# Patient Record
Sex: FEMALE | Race: WHITE | Employment: OTHER | URBAN - METROPOLITAN AREA
[De-identification: names, ages, dates, MRNs, and addresses within clinical notes are randomized per-mention and may not be internally consistent; named-entity substitution may affect disease eponyms.]

---

## 2017-01-05 ENCOUNTER — HOSPITAL ENCOUNTER (EMERGENCY)
Age: 62
Discharge: LEFT BEFORE DISCHARGE | End: 2017-01-05
Attending: EMERGENCY MEDICINE
Payer: COMMERCIAL

## 2017-01-05 VITALS
BODY MASS INDEX: 20.09 KG/M2 | DIASTOLIC BLOOD PRESSURE: 87 MMHG | HEART RATE: 81 BPM | HEIGHT: 67 IN | RESPIRATION RATE: 16 BRPM | OXYGEN SATURATION: 100 % | WEIGHT: 128 LBS | SYSTOLIC BLOOD PRESSURE: 129 MMHG | TEMPERATURE: 98 F

## 2017-01-05 PROCEDURE — 75810000275 HC EMERGENCY DEPT VISIT NO LEVEL OF CARE: Performed by: EMERGENCY MEDICINE

## 2017-01-05 NOTE — ED NOTES
PMD-Dr Vernon Hunt. Pt c/o avulsion to right 4th finger x 15 minutes. She states that she got it caught while closing a gate. No active bleeding.

## 2017-10-30 ENCOUNTER — HOSPITAL ENCOUNTER (OUTPATIENT)
Dept: MAMMOGRAPHY | Age: 62
Discharge: HOME | End: 2017-10-30
Attending: NURSE PRACTITIONER
Payer: COMMERCIAL

## 2017-10-30 DIAGNOSIS — Z12.31 VISIT FOR SCREENING MAMMOGRAM: ICD-10-CM

## 2017-10-30 PROCEDURE — 77067 SCR MAMMO BI INCL CAD: CPT

## 2018-01-24 ENCOUNTER — HOSPITAL ENCOUNTER (OUTPATIENT)
Dept: GENERAL RADIOLOGY | Age: 63
Discharge: HOME | End: 2018-01-24
Payer: COMMERCIAL

## 2018-01-24 ENCOUNTER — HOSPITAL ENCOUNTER (OUTPATIENT)
Dept: LAB | Age: 63
Discharge: HOME | End: 2018-01-24

## 2018-01-24 DIAGNOSIS — J45.909 UNCOMPLICATED ASTHMA, UNSPECIFIED ASTHMA SEVERITY, UNSPECIFIED WHETHER PERSISTENT: ICD-10-CM

## 2018-01-24 LAB
FLUAV AG NPH QL IA: NEGATIVE
FLUBV AG NPH QL IA: NEGATIVE

## 2018-01-24 PROCEDURE — 87804 INFLUENZA ASSAY W/OPTIC: CPT | Performed by: NURSE PRACTITIONER

## 2018-01-24 PROCEDURE — 71046 X-RAY EXAM CHEST 2 VIEWS: CPT

## 2018-01-24 NOTE — PROGRESS NOTES
Pt called and notified that flu swab was negative. She was told not to take the tamiflu. Pt verbalized understanding.

## 2018-03-08 ENCOUNTER — ANESTHESIA EVENT (OUTPATIENT)
Dept: SURGERY | Age: 63
End: 2018-03-08
Payer: COMMERCIAL

## 2018-03-09 ENCOUNTER — ANESTHESIA (OUTPATIENT)
Dept: SURGERY | Age: 63
End: 2018-03-09
Payer: COMMERCIAL

## 2018-03-09 ENCOUNTER — HOSPITAL ENCOUNTER (OUTPATIENT)
Age: 63
Setting detail: OUTPATIENT SURGERY
Discharge: HOME | End: 2018-03-09
Attending: ORTHOPAEDIC SURGERY | Admitting: ORTHOPAEDIC SURGERY
Payer: COMMERCIAL

## 2018-03-09 VITALS
SYSTOLIC BLOOD PRESSURE: 155 MMHG | HEART RATE: 79 BPM | OXYGEN SATURATION: 96 % | TEMPERATURE: 97 F | RESPIRATION RATE: 16 BRPM | DIASTOLIC BLOOD PRESSURE: 90 MMHG

## 2018-03-09 PROCEDURE — A4565 SLINGS: HCPCS | Performed by: ORTHOPAEDIC SURGERY

## 2018-03-09 PROCEDURE — 77030006891 HC BLD SHV RESECT STRY -B: Performed by: ORTHOPAEDIC SURGERY

## 2018-03-09 PROCEDURE — 74011250636 HC RX REV CODE- 250/636

## 2018-03-09 PROCEDURE — 77030003602 HC NDL NRV BLK BBMI -B: Performed by: ANESTHESIOLOGY

## 2018-03-09 PROCEDURE — 77030011640 HC PAD GRND REM COVD -A: Performed by: ORTHOPAEDIC SURGERY

## 2018-03-09 PROCEDURE — 77030020143 HC AIRWY LARYN INTUB CGAS -A: Performed by: REGISTERED NURSE

## 2018-03-09 PROCEDURE — 74011250637 HC RX REV CODE- 250/637: Performed by: ANESTHESIOLOGY

## 2018-03-09 PROCEDURE — 76060000032 HC ANESTHESIA 0.5 TO 1 HR: Performed by: ORTHOPAEDIC SURGERY

## 2018-03-09 PROCEDURE — 77030027384 HC PRB ARTHSCP SERFAS STRY -C: Performed by: ORTHOPAEDIC SURGERY

## 2018-03-09 PROCEDURE — 76010010054 HC POST OP PAIN BLOCK: Performed by: ORTHOPAEDIC SURGERY

## 2018-03-09 PROCEDURE — 77030019605: Performed by: ORTHOPAEDIC SURGERY

## 2018-03-09 PROCEDURE — C1713 ANCHOR/SCREW BN/BN,TIS/BN: HCPCS | Performed by: ORTHOPAEDIC SURGERY

## 2018-03-09 PROCEDURE — 74011250636 HC RX REV CODE- 250/636: Performed by: ORTHOPAEDIC SURGERY

## 2018-03-09 PROCEDURE — 76010000160 HC OR TIME 0.5 TO 1 HR INTENSV-TIER 1: Performed by: ORTHOPAEDIC SURGERY

## 2018-03-09 PROCEDURE — 76210000021 HC REC RM PH II 0.5 TO 1 HR: Performed by: ORTHOPAEDIC SURGERY

## 2018-03-09 PROCEDURE — 74011000250 HC RX REV CODE- 250

## 2018-03-09 PROCEDURE — 77030002933 HC SUT MCRYL J&J -A: Performed by: ORTHOPAEDIC SURGERY

## 2018-03-09 PROCEDURE — 77030003666 HC NDL SPINAL BD -A: Performed by: ORTHOPAEDIC SURGERY

## 2018-03-09 PROCEDURE — 77030004453 HC BUR SHV STRY -B: Performed by: ORTHOPAEDIC SURGERY

## 2018-03-09 PROCEDURE — 76942 ECHO GUIDE FOR BIOPSY: CPT | Performed by: ORTHOPAEDIC SURGERY

## 2018-03-09 PROCEDURE — 76210000006 HC OR PH I REC 0.5 TO 1 HR: Performed by: ORTHOPAEDIC SURGERY

## 2018-03-09 PROCEDURE — 77030006788 HC BLD SAW OSC STRY -B: Performed by: ORTHOPAEDIC SURGERY

## 2018-03-09 PROCEDURE — 77030018836 HC SOL IRR NACL ICUM -A: Performed by: ORTHOPAEDIC SURGERY

## 2018-03-09 PROCEDURE — 74011250636 HC RX REV CODE- 250/636: Performed by: ANESTHESIOLOGY

## 2018-03-09 PROCEDURE — 77030032490 HC SLV COMPR SCD KNE COVD -B: Performed by: ORTHOPAEDIC SURGERY

## 2018-03-09 RX ORDER — SODIUM CHLORIDE 0.9 % (FLUSH) 0.9 %
5-10 SYRINGE (ML) INJECTION AS NEEDED
Status: DISCONTINUED | OUTPATIENT
Start: 2018-03-09 | End: 2018-03-09 | Stop reason: HOSPADM

## 2018-03-09 RX ORDER — LIDOCAINE HYDROCHLORIDE 20 MG/ML
INJECTION, SOLUTION EPIDURAL; INFILTRATION; INTRACAUDAL; PERINEURAL AS NEEDED
Status: DISCONTINUED | OUTPATIENT
Start: 2018-03-09 | End: 2018-03-09 | Stop reason: HOSPADM

## 2018-03-09 RX ORDER — EPINEPHRINE 1 MG/ML
INJECTION, SOLUTION, CONCENTRATE INTRAVENOUS AS NEEDED
Status: DISCONTINUED | OUTPATIENT
Start: 2018-03-09 | End: 2018-03-09 | Stop reason: HOSPADM

## 2018-03-09 RX ORDER — FENTANYL CITRATE 50 UG/ML
100 INJECTION, SOLUTION INTRAMUSCULAR; INTRAVENOUS ONCE
Status: COMPLETED | OUTPATIENT
Start: 2018-03-09 | End: 2018-03-09

## 2018-03-09 RX ORDER — PROPOFOL 10 MG/ML
INJECTION, EMULSION INTRAVENOUS AS NEEDED
Status: DISCONTINUED | OUTPATIENT
Start: 2018-03-09 | End: 2018-03-09 | Stop reason: HOSPADM

## 2018-03-09 RX ORDER — OXYCODONE HYDROCHLORIDE 5 MG/1
5 TABLET ORAL
Status: DISCONTINUED | OUTPATIENT
Start: 2018-03-09 | End: 2018-03-09 | Stop reason: HOSPADM

## 2018-03-09 RX ORDER — LIDOCAINE HYDROCHLORIDE 10 MG/ML
0.1 INJECTION INFILTRATION; PERINEURAL AS NEEDED
Status: DISCONTINUED | OUTPATIENT
Start: 2018-03-09 | End: 2018-03-09 | Stop reason: HOSPADM

## 2018-03-09 RX ORDER — DEXAMETHASONE SODIUM PHOSPHATE 4 MG/ML
INJECTION, SOLUTION INTRA-ARTICULAR; INTRALESIONAL; INTRAMUSCULAR; INTRAVENOUS; SOFT TISSUE AS NEEDED
Status: DISCONTINUED | OUTPATIENT
Start: 2018-03-09 | End: 2018-03-09 | Stop reason: HOSPADM

## 2018-03-09 RX ORDER — SODIUM CHLORIDE 0.9 % (FLUSH) 0.9 %
5-10 SYRINGE (ML) INJECTION EVERY 8 HOURS
Status: DISCONTINUED | OUTPATIENT
Start: 2018-03-09 | End: 2018-03-09 | Stop reason: HOSPADM

## 2018-03-09 RX ORDER — MIDAZOLAM HYDROCHLORIDE 1 MG/ML
2 INJECTION, SOLUTION INTRAMUSCULAR; INTRAVENOUS ONCE
Status: COMPLETED | OUTPATIENT
Start: 2018-03-09 | End: 2018-03-09

## 2018-03-09 RX ORDER — ONDANSETRON 2 MG/ML
INJECTION INTRAMUSCULAR; INTRAVENOUS AS NEEDED
Status: DISCONTINUED | OUTPATIENT
Start: 2018-03-09 | End: 2018-03-09 | Stop reason: HOSPADM

## 2018-03-09 RX ORDER — SCOLOPAMINE TRANSDERMAL SYSTEM 1 MG/1
1 PATCH, EXTENDED RELEASE TRANSDERMAL ONCE
Status: DISCONTINUED | OUTPATIENT
Start: 2018-03-09 | End: 2018-03-09 | Stop reason: HOSPADM

## 2018-03-09 RX ORDER — ALBUTEROL SULFATE 0.83 MG/ML
2.5 SOLUTION RESPIRATORY (INHALATION) AS NEEDED
Status: DISCONTINUED | OUTPATIENT
Start: 2018-03-09 | End: 2018-03-09 | Stop reason: HOSPADM

## 2018-03-09 RX ORDER — CELECOXIB 200 MG/1
200 CAPSULE ORAL
Status: DISCONTINUED | OUTPATIENT
Start: 2018-03-09 | End: 2018-03-09

## 2018-03-09 RX ORDER — HYDROMORPHONE HYDROCHLORIDE 2 MG/ML
0.5 INJECTION, SOLUTION INTRAMUSCULAR; INTRAVENOUS; SUBCUTANEOUS
Status: DISCONTINUED | OUTPATIENT
Start: 2018-03-09 | End: 2018-03-09 | Stop reason: HOSPADM

## 2018-03-09 RX ORDER — SODIUM CHLORIDE, SODIUM LACTATE, POTASSIUM CHLORIDE, CALCIUM CHLORIDE 600; 310; 30; 20 MG/100ML; MG/100ML; MG/100ML; MG/100ML
50 INJECTION, SOLUTION INTRAVENOUS CONTINUOUS
Status: DISCONTINUED | OUTPATIENT
Start: 2018-03-09 | End: 2018-03-09 | Stop reason: HOSPADM

## 2018-03-09 RX ORDER — SODIUM CHLORIDE, SODIUM LACTATE, POTASSIUM CHLORIDE, CALCIUM CHLORIDE 600; 310; 30; 20 MG/100ML; MG/100ML; MG/100ML; MG/100ML
75 INJECTION, SOLUTION INTRAVENOUS CONTINUOUS
Status: DISCONTINUED | OUTPATIENT
Start: 2018-03-09 | End: 2018-03-09 | Stop reason: HOSPADM

## 2018-03-09 RX ORDER — EPHEDRINE SULFATE 50 MG/ML
INJECTION, SOLUTION INTRAVENOUS AS NEEDED
Status: DISCONTINUED | OUTPATIENT
Start: 2018-03-09 | End: 2018-03-09 | Stop reason: HOSPADM

## 2018-03-09 RX ORDER — CEFAZOLIN SODIUM/WATER 2 G/20 ML
2 SYRINGE (ML) INTRAVENOUS ONCE
Status: COMPLETED | OUTPATIENT
Start: 2018-03-09 | End: 2018-03-09

## 2018-03-09 RX ORDER — MIDAZOLAM HYDROCHLORIDE 1 MG/ML
2 INJECTION, SOLUTION INTRAMUSCULAR; INTRAVENOUS
Status: DISCONTINUED | OUTPATIENT
Start: 2018-03-09 | End: 2018-03-09 | Stop reason: HOSPADM

## 2018-03-09 RX ADMIN — Medication 2 G: at 08:45

## 2018-03-09 RX ADMIN — LIDOCAINE HYDROCHLORIDE 40 MG: 20 INJECTION, SOLUTION EPIDURAL; INFILTRATION; INTRACAUDAL; PERINEURAL at 08:41

## 2018-03-09 RX ADMIN — EPHEDRINE SULFATE 10 MG: 50 INJECTION, SOLUTION INTRAVENOUS at 09:08

## 2018-03-09 RX ADMIN — FENTANYL CITRATE 50 MCG: 50 INJECTION INTRAMUSCULAR; INTRAVENOUS at 07:59

## 2018-03-09 RX ADMIN — SODIUM CHLORIDE, SODIUM LACTATE, POTASSIUM CHLORIDE, AND CALCIUM CHLORIDE 75 ML/HR: 600; 310; 30; 20 INJECTION, SOLUTION INTRAVENOUS at 08:00

## 2018-03-09 RX ADMIN — DEXAMETHASONE SODIUM PHOSPHATE 4 MG: 4 INJECTION, SOLUTION INTRA-ARTICULAR; INTRALESIONAL; INTRAMUSCULAR; INTRAVENOUS; SOFT TISSUE at 08:41

## 2018-03-09 RX ADMIN — PROPOFOL 200 MG: 10 INJECTION, EMULSION INTRAVENOUS at 08:41

## 2018-03-09 RX ADMIN — MIDAZOLAM HYDROCHLORIDE 2 MG: 1 INJECTION, SOLUTION INTRAMUSCULAR; INTRAVENOUS at 07:59

## 2018-03-09 RX ADMIN — ONDANSETRON 4 MG: 2 INJECTION INTRAMUSCULAR; INTRAVENOUS at 08:41

## 2018-03-09 NOTE — DISCHARGE INSTRUCTIONS
Post-Operative Instructions   For  Shoulder Arthroscopy  Phone:  [phone]    1. Apply ice to the shoulder as needed. 20 to 30 minutes at a time while awake    2. You may shower after the arthroscopy. Keep dressings in place for the first three days and do not get them wet. After three days, you may remove the dressings and leave the steri-strip bandages in place; they will peel off naturally. 3. You may discontinue use of your sling after the block wears off and begin active range of motion of the elbow as tolerated by pain, unless you are instructed otherwise. Do not lift arm by your side for 4 weeks. 4. Begin therapy as ordered. 5. Use any pain medication as instructed. You should take your pain medication as soon as you feel the anesthetic wearing off. Do not wait until you are in severe pain to begin taking your pain medication. 6. You may have some side effects from your pain medication. If you have nausea, try taking your medication with food. For itching, you may take over the counter Benadryl. 7. You may have been given a prescription for Phenergan. This medication is used for nausea and vomiting. You do not need to get this prescription filled unless you have a problem. Remove scop patch in 72 hours and wash hands well after . May remove sooner if desired  8. If you have a problem, please call [address] [place] [address] at [phone], P.A. DIET  · Clear liquids until no nausea or vomiting; then light diet for the first day. · Advance to regular diet on second day, unless your doctor orders otherwise. · If nausea and vomiting continues, call your doctor. PAIN  · Take pain medication as directed by your doctor. · Call your doctor if pain is NOT relieved by medication.        CALL YOUR DOCTOR IF   · Excessive bleeding that does not stop after holding pressure over the area  · Temperature of 101 degrees F or above  · Excessive redness, swelling or bruising, and/ or green or yellow, smelly discharge from incision    AFTER ANESTHESIA   · For the first 24 hours: DO NOT Drive, Drink alcoholic beverages, or Make important decisions. · Be aware of dizziness following anesthesia and while taking pain medication. APPOINTMENT DATE/ TIME  His office will call you    YOUR DOCTOR'S PHONE NUMBER [phone]      DISCHARGE SUMMARY from Nurse    PATIENT INSTRUCTIONS:    After general anesthesia or intravenous sedation, for 24 hours or while taking prescription Narcotics:  · Limit your activities  · Do not drive and operate hazardous machinery  · Do not make important personal or business decisions  · Do  not drink alcoholic beverages  · If you have not urinated within 8 hours after discharge, please contact your surgeon on call. *  Please give a list of your current medications to your Primary Care Provider. *  Please update this list whenever your medications are discontinued, doses are      changed, or new medications (including over-the-counter products) are added. *  Please carry medication information at all times in case of emergency situations. These are general instructions for a healthy lifestyle:    No smoking/ No tobacco products/ Avoid exposure to second hand smoke    Surgeon General's Warning:  Quitting smoking now greatly reduces serious risk to your health. Obesity, smoking, and sedentary lifestyle greatly increases your risk for illness    A healthy diet, regular physical exercise & weight monitoring are important for maintaining a healthy lifestyle    You may be retaining fluid if you have a history of heart failure or if you experience any of the following symptoms:  Weight gain of 3 pounds or more overnight or 5 pounds in a week, increased swelling in our hands or feet or shortness of breath while lying flat in bed.   Please call your doctor as soon as you notice any of these symptoms; do not wait until your next office visit. Recognize signs and symptoms of STROKE:    F-face looks uneven    A-arms unable to move or move unevenly    S-speech slurred or non-existent    T-time-call 911 as soon as signs and symptoms begin-DO NOT go       Back to bed or wait to see if you get better-TIME IS BRAIN.

## 2018-03-09 NOTE — IP AVS SNAPSHOT
Carmela Chamberlain 
 
 
 [facility] [place] [facility] 
[phone] Patient: Gregoria Ryan MRN: [id number] :1955 About your hospitalization You were admitted on:  2018 You last received care in the:  [facility] OP PACU You were discharged on:  2018 Why you were hospitalized Your primary diagnosis was:  Not on File Follow-up Information Follow up With Details Comments Contact Info Lacy Ferguson NP   [address] [place] [address] 
[phone] Mira Terrell MD  his office will call you [address] [address] 
[phone] Your Scheduled Appointments 2018  8:00 AM EDT  
(Arrive by 7:40 AM) Return appointment with [facility] ([facility]) [address]  [address] [facility]  
[phone] Discharge Orders None A check jordyn indicates which time of day the medication should be taken. My Medications ASK your doctor about these medications Instructions Each Dose to Equal  
 Morning Noon Evening Bedtime * albuterol 90 mcg/actuation inhaler Commonly known as:  PROAIR HFA Your last dose was: Your next dose is: Take 2 Puffs by inhalation every four (4) hours as needed for Wheezing. 2 Puff * albuterol 2.5 mg /3 mL (0.083 %) nebulizer solution Commonly known as:  PROVENTIL VENTOLIN Your last dose was: Your next dose is:    
   
   
 Use every 4 hours in nebulizer machine  
     
   
   
   
  
 amphetamine-dextroamphetamine XR 30 mg XR capsule Commonly known as:  ADDERALL XR Your last dose was: Your next dose is: Take 30 mg by mouth every morningIndications: Narcolepsy Syndrome. 30 mg  
    
   
   
   
  
 ARMOUR THYROID 15 mg tablet Generic drug:  thyroid (Pork) Your last dose was: Your next dose is: Take 15 mg by mouth daily. 15 mg  
    
   
   
   
  
 budesonide-formoterol 160-4.5 mcg/actuation Hfaa Commonly known as:  SYMBICORT Your last dose was: Your next dose is: Take 2 Puffs by inhalation two (2) times a day. 2 Puff  
    
   
   
   
  
 dicyclomine 10 mg capsule Commonly known as:  BENTYL Your last dose was: Your next dose is: TAKE 1 CAPSULE BY ORAL ROUTE 3 TIMES EVERY DAY  
     
   
   
   
  
 fluticasone 50 mcg/actuation nasal spray Commonly known as:  Lori Kalia Your last dose was: Your next dose is: 2 Sprays by Both Nostrils route daily. 2 Spray  
    
   
   
   
  
 ibuprofen 800 mg tablet Commonly known as:  MOTRIN Your last dose was: Your next dose is: Take 1 Tab by mouth every eight (8) hours as needed for Pain. 800 mg LINZESS 145 mcg Cap capsule Generic drug:  linaclotide Your last dose was: Your next dose is: Take 1 Cap by mouth Daily (before breakfast). 145 mcg  
    
   
   
   
  
 lisinopril 10 mg tablet Commonly known as:  Carrieva Hazel Your last dose was: Your next dose is: Take 1 Tab by mouth daily. 10 mg  
    
   
   
   
  
 multivitamin tablet Commonly known as:  ONE A DAY Your last dose was: Your next dose is: Take 1 Tab by mouth daily. Stop 11/1/11  
 1 Tab OTHER Your last dose was: Your next dose is:    
   
   
 by IntraMUSCular route Before breakfast, lunch, dinner and at bedtime. Indications: Someroelin (human growth hormone) injections-Dr Rodriguez-  
     
   
   
   
  
 pantoprazole 40 mg tablet Commonly known as:  PROTONIX Your last dose was: Your next dose is: One dialy  
     
   
   
   
  
 sucralfate 100 mg/mL suspension Commonly known as:  Jose Francisco Iverson Your last dose was: Your next dose is:    
   
   
 1 tsp every 8 hours for esophageal pain * Notice: This list has 2 medication(s) that are the same as other medications prescribed for you. Read the directions carefully, and ask your doctor or other care provider to review them with you. Discharge Instructions Post-Operative Instructions For Shoulder Arthroscopy Phone:  [phone] 1. Apply ice to the shoulder as needed. 20 to 30 minutes at a time while awake 2. You may shower after the arthroscopy. Keep dressings in place for the first three days and do not get them wet. After three days, you may remove the dressings and leave the steri-strip bandages in place; they will peel off naturally. 3. You may discontinue use of your sling after the block wears off and begin active range of motion of the elbow as tolerated by pain, unless you are instructed otherwise. Do not lift arm by your side for 4 weeks. 4. Begin therapy as ordered. 5. Use any pain medication as instructed. You should take your pain medication as soon as you feel the anesthetic wearing off. Do not wait until you are in severe pain to begin taking your pain medication. 6. You may have some side effects from your pain medication. If you have nausea, try taking your medication with food. For itching, you may take over the counter Benadryl. 7. You may have been given a prescription for Phenergan. This medication is used for nausea and vomiting. You do not need to get this prescription filled unless you have a problem. 8. If you have a problem, please call [place] [address] at [phone] [facility] [employer] DIET · Clear liquids until no nausea or vomiting; then light diet for the first day. · Advance to regular diet on second day, unless your doctor orders otherwise. · If nausea and vomiting continues, call your doctor. PAIN 
· Take pain medication as directed by your doctor. · Call your doctor if pain is NOT relieved by medication. CALL YOUR DOCTOR IF  
· Excessive bleeding that does not stop after holding pressure over the area · Temperature of 101 degrees F or above · Excessive redness, swelling or bruising, and/ or green or yellow, smelly discharge from incision AFTER ANESTHESIA · For the first 24 hours: DO NOT Drive, Drink alcoholic beverages, or Make important decisions. · Be aware of dizziness following anesthesia and while taking pain medication. APPOINTMENT DATE/ TIME  His office will call you YOUR DOCTOR'S PHONE NUMBER [phone] DISCHARGE SUMMARY from Nurse PATIENT INSTRUCTIONS: 
 
After general anesthesia or intravenous sedation, for 24 hours or while taking prescription Narcotics: · Limit your activities · Do not drive and operate hazardous machinery · Do not make important personal or business decisions · Do  not drink alcoholic beverages · If you have not urinated within 8 hours after discharge, please contact your surgeon on call. *  Please give a list of your current medications to your Primary Care Provider. *  Please update this list whenever your medications are discontinued, doses are 
    changed, or new medications (including over-the-counter products) are added. *  Please carry medication information at all times in case of emergency situations. These are general instructions for a healthy lifestyle: No smoking/ No tobacco products/ Avoid exposure to second hand smoke Surgeon General's Warning:  Quitting smoking now greatly reduces serious risk to your health. Obesity, smoking, and sedentary lifestyle greatly increases your risk for illness A healthy diet, regular physical exercise & weight monitoring are important for maintaining a healthy lifestyle You may be retaining fluid if you have a history of heart failure or if you experience any of the following symptoms:  Weight gain of 3 pounds or more overnight or 5 pounds in a week, increased swelling in our hands or feet or shortness of breath while lying flat in bed. Please call your doctor as soon as you notice any of these symptoms; do not wait until your next office visit. Recognize signs and symptoms of STROKE: 
 
F-face looks uneven A-arms unable to move or move unevenly S-speech slurred or non-existent T-time-call 911 as soon as signs and symptoms begin-DO NOT go Back to bed or wait to see if you get better-TIME IS BRAIN. Introducing [facility]! Dear Domenico Gentile: Thank you for requesting a [facility] account. Our records indicate that you already have an active [facility] account. You can access your account anytime at [email or web address] Did you know that you can access your hospital and ER discharge instructions at any time in [facility]? You can also review all of your test results from your hospital stay or ER visit. Additional Information If you have questions, please visit the Frequently Asked Questions section of the [employer] website at [email or web address] Remember, [employer] [facility] is NOT to be used for urgent needs. For medical emergencies, dial 911. Now available from your iPhone and Android! Providers Seen During Your Hospitalization Provider Specialty Primary office phone Asiya Hernandes MD Orthopedic Surgery [phone] Your Primary Care Physician (PCP) Primary Care Physician Office Phone Office Fax [facility] [address] [place] [phone] You are allergic to the following Allergen Reactions Compazine (Prochlorperazine) Seizures Dystonic reaction Recent Documentation OB Status Smoking Status Hysterectomy Former Smoker Emergency Contacts Name Discharge Info Relation Home Work Mobile Tessa Phalen  Child [2] [phone] Monico Barth  Spouse [3] [phone] Patient Belongings The following personal items are in your possession at time of discharge: 
  Dental Appliances: None Please provide this summary of care documentation to your next provider. Signatures-by signing, you are acknowledging that this After Visit Summary has been reviewed with you and you have received a copy. Patient Signature:  ____________________________________________________________ Date:  ____________________________________________________________  
  
[facility] Provider Signature:  ____________________________________________________________ Date:  ____________________________________________________________

## 2018-03-09 NOTE — H&P
Outpatient Surgery History and Physical:  Marya Cota was seen and examined. CHIEF COMPLAINT:    l shoulder . PE:   There were no vitals taken for this visit. Heart:   Regular rhythm      Lungs:  Are clear      Past Medical History:    Patient Active Problem List    Diagnosis    Chest pain    Chronic cough    Personal history of tobacco use    Restless legs syndrome    ADHD (attention deficit hyperactivity disorder)    Arthritis     osteo, neck      Female stress incontinence    Insomnia, unspecified    Chronic constipation    Gastroesophageal reflux disease without esophagitis    External hemorrhoids with complication    Internal hemorrhoids with complication       Surgical History:   Past Surgical History:   Procedure Laterality Date    HX BREAST AUGMENTATION      HX CERVICAL DISKECTOMY      HX  SECTION  ,     HX HEENT      jaw / steel plate and screws    HX PARTIAL HYSTERECTOMY  1990    ovaries remain    HX RHINOPLASTY  1982    TOTAL HIP ARTHROPLASTY Right 2007       Social History: Patient  reports that she quit smoking about 12 years ago. She has a 8.00 pack-year smoking history. She has never used smokeless tobacco. She reports that she drinks about 1.2 - 1.8 oz of alcohol per week  She reports that she does not use illicit drugs. Family History:   Family History   Problem Relation Age of Onset    Heart Disease Mother     Arrhythmia Mother     Heart Attack Mother    Solis Grey Sister     Breast Cancer Sister 43    Cancer Brother      brain    Asthma Daughter     Breast Cancer Sister     Cancer Sister      brain cancer (sister and brother)        Allergies: Reviewed per EMR  Allergies   Allergen Reactions    Compazine [Prochlorperazine] Seizures     Dystonic reaction       Medications:    No current facility-administered medications on file prior to encounter.       Current Outpatient Prescriptions on File Prior to Encounter   Medication Sig  dicyclomine (BENTYL) 10 mg capsule TAKE 1 CAPSULE BY ORAL ROUTE 3 TIMES EVERY DAY    sucralfate (CARAFATE) 100 mg/mL suspension 1 tsp every 8 hours for esophageal pain (Patient taking differently: Take 1 tsp by mouth every eight (8) hours as needed. 1 tsp every 8 hours for esophageal pain)    budesonide-formoterol (SYMBICORT) 160-4.5 mcg/actuation HFAA Take 2 Puffs by inhalation two (2) times a day.  albuterol (PROAIR HFA) 90 mcg/actuation inhaler Take 2 Puffs by inhalation every four (4) hours as needed for Wheezing.  albuterol (PROVENTIL VENTOLIN) 2.5 mg /3 mL (0.083 %) nebulizer solution Use every 4 hours in nebulizer machine    LINZESS 145 mcg cap capsule Take 1 Cap by mouth Daily (before breakfast).  pantoprazole (PROTONIX) 40 mg tablet One dialy (Patient taking differently: Take 40 mg by mouth two (2) times a day. One dialy)    lisinopril (PRINIVIL, ZESTRIL) 10 mg tablet Take 1 Tab by mouth daily.  fluticasone (FLONASE) 50 mcg/actuation nasal spray 2 Sprays by Both Nostrils route daily.  OTHER by IntraMUSCular route Before breakfast, lunch, dinner and at bedtime. Indications: Someroelin (human growth hormone) injections-Dr Rodriguez-    ibuprofen (MOTRIN) 800 mg tablet Take 1 Tab by mouth every eight (8) hours as needed for Pain. (Patient taking differently: Take 800 mg by mouth every eight (8) hours as needed for Pain (last dose 3/4/18). )    amphetamine-dextroamphetamine XR (ADDERALL XR) 30 mg XR capsule Take 30 mg by mouth every morningIndications: Narcolepsy Syndrome.  multivitamin (ONE A DAY) tablet Take 1 Tab by mouth daily. Stop 11/1/11        The surgery is planned for the  Shoulder . History and physical has been reviewed. The patient has been examined. There have been no significant clinical changes since the completion of the originally dated History and Physical.  Patient identified by surgeon; surgical site was confirmed by patient and surgeon.       The patient is here today for outpatient surgery. I have examined the patient, no changes are noted in the patient's medical status. Necessity for the procedure/care is still present and the history and physical above is current. See the office notes for the full long term history of the problem. Please see the recent office notes for the musculoskeletal examination.     Signed By: Mira Terrell MD     March 9, 2018 6:53 AM

## 2018-03-09 NOTE — ANESTHESIA PROCEDURE NOTES
Peripheral Block    Start time: 3/9/2018 7:59 AM  End time: 3/9/2018 8:04 AM  Performed by: Ping Weeks  Authorized by: Ping Weeks       Pre-procedure: Indications: at surgeon's request and post-op pain management    Preanesthetic Checklist: patient identified, risks and benefits discussed, site marked, timeout performed, anesthesia consent given and patient being monitored    Timeout Time: 07:59          Block Type:   Block Type:   Interscalene  Laterality:  Left  Monitoring:  Frequent vital sign checks, heart rate, oxygen, continuous pulse ox and responsive to questions  Injection Technique:  Single shot  Procedures: ultrasound guided and nerve stimulator    Patient Position: supine  Prep: chlorhexidine    Location:  Interscalene  Needle Type:  Stimuplex  Needle Gauge:  22 G  Needle Localization:  Nerve stimulator and ultrasound guidance  Motor Response: minimal motor response >0.4 mA    Medication Injected:  0.5%  ropivacaine  Adds:  Epi 1:200K  Volume (mL):  30    Assessment:  Number of attempts:  1  Injection Assessment:  Incremental injection every 5 mL, negative aspiration for CSF, no paresthesia, ultrasound image on chart, no intravascular symptoms, negative aspiration for blood and local visualized surrounding nerve on ultrasound  Patient tolerance:  Patient tolerated the procedure well with no immediate complications

## 2018-03-09 NOTE — ANESTHESIA PREPROCEDURE EVALUATION
Anesthetic History   No history of anesthetic complications            Review of Systems / Medical History  Patient summary reviewed, nursing notes reviewed and pertinent labs reviewed    Pulmonary            Asthma        Neuro/Psych   Within defined limits           Cardiovascular    Hypertension              Exercise tolerance: >4 METS     GI/Hepatic/Renal     GERD          Comments: Esophageal spasms Endo/Other      Hypothyroidism: well controlled  Arthritis     Other Findings              Physical Exam    Airway  Mallampati: II      Mouth opening: Normal     Cardiovascular  Regular rate and rhythm,  S1 and S2 normal,  no murmur, click, rub, or gallop             Dental  No notable dental hx       Pulmonary  Breath sounds clear to auscultation               Abdominal         Other Findings            Anesthetic Plan    ASA: 2  Anesthesia type: general - interscalene block      Post-op pain plan if not by surgeon: peripheral nerve block single    Induction: Intravenous  Anesthetic plan and risks discussed with: Patient

## 2018-03-09 NOTE — OP NOTES
[facility] REPORT    Amparo Tapia  MR#: [id number]  : 1955  ACCOUNT #: [de-identified]   DATE OF SERVICE: 2018    PREOPERATIVE DIAGNOSIS:  Possible rotator cuff tear of the left shoulder. POSTOPERATIVE DIAGNOSES  1. Massive irreparable tear of the left rotator cuff. 2.  Biceps tear with marked subluxation of the biceps tendon. 3.  Mild impingement of the left shoulder. PROCEDURES PERFORMED  1. Arthroscopic biceps tenotomy. 2.  Debridement of massive tear of the rotator cuff. 3.  Subacromial decompression. SURGEON:  Kash Fernandez MD.     ANESTHESIA:  General.    ESTIMATED BLOOD LOSS:  Minimal.      SPECIMENS REMOVED:  None. COMPLICATIONS:  None. ASSISTANT:  None. IMPLANTS:  None. DESCRIPTION OF PROCEDURE:  After an adequate level of general anesthesia was obtained, the patient's left shoulder was prepped and draped in usual sterile fashion, had a block per anesthesia, postop pain management, received antibiotics. She had good motion of her shoulder under anesthesia. The joint was distended posteriorly with saline. The articular surface looked fine, but there was subluxation of the biceps tendon with some tearing distally as we probed through an anterior portal.  It was very clear that this patient had a massive tear of the rotator cuff. The arthroscope was easily slipped into the subacromial space and the patient had a massive tear of the rotator cuff. The biceps had subluxated more superiorly and had marked attrition distally. It was elected to perform a biceps tenotomy based on the findings. She did not have a lot of degenerative change at the [facility] joint, had some mild hooking of the acromion and a decompression was performed, but still providing a good arch. This was a massive rotator cuff tear and it was not possible to repair it. Multiple attempts at trying to mobilize the tissue were futile.   There was virtually no tissue anteriorly and then what appeared to be reasonable tissue posteriorly had a lot of fatty degeneration and atrophy and would not mobilize. Thus, a debridement was performed. The wound was copiously irrigated. The portal closed with Steri-Strips. Sterile dressings applied. Arm was placed in a sling. She tolerated the procedure well.       MD Say Eubanks / Sherif Chapman  D: 03/09/2018 09:18     T: 03/09/2018 09:44  JOB #: [id number]  CC: Denver Haskell MD

## 2018-03-09 NOTE — ANESTHESIA POSTPROCEDURE EVALUATION
Post-Anesthesia Evaluation and Assessment    Patient: Sydney Sheriff MRN: [id number]  SSN: [id number]    YOB: 1955  Age: 57 y.o. Sex: female       Cardiovascular Function/Vital Signs  Visit Vitals    /84    Pulse 72    Temp 36.1 °C (97 °F)    Resp 16    SpO2 99%       Patient is status post general anesthesia for Procedure(s):  SHOULDER ARTHROSCOPY DEBRIDEMENT OF ROTATOR CUFF/ BICEPS TENOTOMY. Nausea/Vomiting: Mild    Postoperative hydration reviewed and adequate. Pain:  Pain Scale 1: Numeric (0 - 10) (18)  Pain Intensity 1: 0 (18)   Managed    Neurological Status:   Neuro (WDL):  (drowsy) (18)   Left interscalene block otherwise normal    Mental Status and Level of Consciousness: Arousable    Pulmonary Status:   O2 Device: Nasal cannula (18)   Adequate oxygenation and airway patent    Complications related to anesthesia: None    Post-anesthesia assessment completed.  No concerns    Signed By: John Wellington MD     2018

## 2019-01-18 ENCOUNTER — APPOINTMENT (OUTPATIENT)
Dept: GENERAL RADIOLOGY | Age: 64
End: 2019-01-18
Attending: EMERGENCY MEDICINE
Payer: MEDICARE

## 2019-01-18 ENCOUNTER — HOSPITAL ENCOUNTER (EMERGENCY)
Age: 64
Discharge: HOME | End: 2019-01-18
Attending: EMERGENCY MEDICINE
Payer: MEDICARE

## 2019-01-18 VITALS
SYSTOLIC BLOOD PRESSURE: 156 MMHG | TEMPERATURE: 98.3 F | DIASTOLIC BLOOD PRESSURE: 83 MMHG | OXYGEN SATURATION: 97 % | HEART RATE: 84 BPM | RESPIRATION RATE: 16 BRPM

## 2019-01-18 DIAGNOSIS — S00.83XA FACIAL CONTUSION, INITIAL ENCOUNTER: ICD-10-CM
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
## 2019-01-18 PROCEDURE — 99283 EMERGENCY DEPT VISIT LOW MDM: CPT | Performed by: EMERGENCY MEDICINE

## 2019-01-18 PROCEDURE — 70160 X-RAY EXAM OF NASAL BONES: CPT

## 2019-01-18 PROCEDURE — 70360 X-RAY EXAM OF NECK: CPT
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
## 2019-01-18 NOTE — ED NOTES
I have reviewed discharge instructions with the patient. The patient verbalized understanding. Patient left ED via Discharge Method: ambulatory to Home with  self). Opportunity for questions and clarification provided. Patient given 0 scripts. To continue your aftercare when you leave the hospital, you may receive an automated call from our care team to check in on how you are doing. This is a free service and part of our promise to provide the best care and service to meet your aftercare needs.  If you have questions, or wish to unsubscribe from this service please call [phone]. Thank you for Choosing our [facility] Emergency Department.

## 2019-02-09 ENCOUNTER — HOSPITAL ENCOUNTER (OUTPATIENT)
Dept: MAMMOGRAPHY | Age: 64
Discharge: HOME | End: 2019-02-09
Attending: NURSE PRACTITIONER
Payer: MEDICARE

## 2019-02-09 DIAGNOSIS — Z12.31 VISIT FOR SCREENING MAMMOGRAM: ICD-10-CM

## 2019-02-09 PROCEDURE — 77067 SCR MAMMO BI INCL CAD: CPT

## 2019-03-07 ENCOUNTER — HOSPITAL ENCOUNTER (OUTPATIENT)
Dept: CT IMAGING | Age: 64
Discharge: HOME | End: 2019-03-07
Attending: NURSE PRACTITIONER
Payer: MEDICARE

## 2019-03-07 DIAGNOSIS — G44.329 CHRONIC POST-TRAUMATIC HEADACHE, NOT INTRACTABLE: ICD-10-CM

## 2019-03-07 PROCEDURE — 70450 CT HEAD/BRAIN W/O DYE: CPT

## 2019-03-26 PROBLEM — L40.50 PSORIATIC ARTHRITIS (HCC): Status: ACTIVE | Noted: 2019-03-26

## 2019-03-26 PROBLEM — J45.30 MILD PERSISTENT ASTHMA WITHOUT COMPLICATION: Status: ACTIVE | Noted: 2019-03-26

## 2019-04-01 ENCOUNTER — HOSPITAL ENCOUNTER (OUTPATIENT)
Dept: CT IMAGING | Age: 64
Discharge: HOME | End: 2019-04-01
Attending: NURSE PRACTITIONER
Payer: MEDICARE

## 2019-04-01 DIAGNOSIS — R05.3 CHRONIC COUGH: Chronic | ICD-10-CM

## 2019-04-01 PROCEDURE — 71250 CT THORAX DX C-: CPT

## 2019-04-01 NOTE — PROGRESS NOTES
Pt was notified that CT does not show any findings contributing to her cough. Pt verbalized understanding.

## 2019-04-05 ENCOUNTER — HOSPITAL ENCOUNTER (OUTPATIENT)
Dept: MAMMOGRAPHY | Age: 64
Discharge: HOME | End: 2019-04-05
Attending: NURSE PRACTITIONER
Payer: MEDICARE

## 2019-04-05 DIAGNOSIS — Z78.0 POST-MENOPAUSAL: ICD-10-CM

## 2019-04-05 PROCEDURE — 77080 DXA BONE DENSITY AXIAL: CPT

## 2019-06-20 ENCOUNTER — HOSPITAL ENCOUNTER (OUTPATIENT)
Dept: CT IMAGING | Age: 64
Discharge: HOME | End: 2019-06-20
Attending: OTOLARYNGOLOGY
Payer: MEDICARE

## 2019-06-20 DIAGNOSIS — J32.9 CHRONIC SINUSITIS, UNSPECIFIED LOCATION: ICD-10-CM

## 2019-06-20 PROCEDURE — 70486 CT MAXILLOFACIAL W/O DYE: CPT

## 2020-01-14 ENCOUNTER — HOSPITAL ENCOUNTER (OUTPATIENT)
Dept: GENERAL RADIOLOGY | Age: 65
Discharge: HOME | End: 2020-01-14
Attending: INTERNAL MEDICINE
Payer: MEDICARE

## 2020-01-14 DIAGNOSIS — M25.50 PAIN IN JOINT INVOLVING MULTIPLE SITES: ICD-10-CM

## 2020-01-14 PROCEDURE — 73630 X-RAY EXAM OF FOOT: CPT

## 2020-01-14 PROCEDURE — 73130 X-RAY EXAM OF HAND: CPT

## 2020-01-14 PROCEDURE — 72202 X-RAY EXAM SI JOINTS 3/> VWS: CPT

## 2020-01-15 PROBLEM — L40.50 PSORIATIC ARTHRITIS (HCC): Status: RESOLVED | Noted: 2019-03-26 | Resolved: 2020-01-15

## 2020-01-15 NOTE — PROGRESS NOTES
Please advise pt xr si joint is negative, xr hands show erosive osteoarthritis, xr feet show expected wear and tear arthritis. I will order mri pelvis and she should schedule f/u with me 1 week after this to review.  thanks

## 2020-05-14 PROBLEM — Z86.010 HISTORY OF COLONIC POLYPS: Status: ACTIVE | Noted: 2020-05-14

## 2020-05-14 PROBLEM — Z83.71 FAMILY HISTORY OF COLONIC POLYPS: Status: ACTIVE | Noted: 2020-05-14

## 2020-05-14 PROBLEM — K59.09 CONSTIPATION, CHRONIC: Status: ACTIVE | Noted: 2020-05-14

## 2020-05-14 PROBLEM — K57.30 DVRTCLOS OF LG INT W/O PERFORATION OR ABSCESS W/O BLEEDING: Status: ACTIVE | Noted: 2020-05-14

## 2020-05-14 PROBLEM — R79.89 ELEVATED LFTS: Status: ACTIVE | Noted: 2020-05-14

## 2020-06-24 ENCOUNTER — HOSPITAL ENCOUNTER (OUTPATIENT)
Dept: PHYSICAL THERAPY | Age: 65
End: 2020-06-24

## 2020-08-17 ENCOUNTER — HOSPITAL ENCOUNTER (OUTPATIENT)
Dept: PHYSICAL THERAPY | Age: 65
Discharge: HOME | End: 2020-08-17
Payer: MEDICARE

## 2020-08-17 PROCEDURE — 97140 MANUAL THERAPY 1/> REGIONS: CPT

## 2020-08-17 PROCEDURE — 97110 THERAPEUTIC EXERCISES: CPT

## 2020-08-17 PROCEDURE — 97162 PT EVAL MOD COMPLEX 30 MIN: CPT
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
## 2020-08-19 ENCOUNTER — APPOINTMENT (OUTPATIENT)
Dept: PHYSICAL THERAPY | Age: 65
End: 2020-08-19
Payer: MEDICARE

## 2020-08-27 ENCOUNTER — APPOINTMENT (OUTPATIENT)
Dept: PHYSICAL THERAPY | Age: 65
End: 2020-08-27
Payer: MEDICARE

## 2020-10-26 ENCOUNTER — HOSPITAL ENCOUNTER (EMERGENCY)
Age: 65
Discharge: HOME | End: 2020-10-27
Attending: EMERGENCY MEDICINE
Payer: MEDICARE

## 2020-10-26 ENCOUNTER — APPOINTMENT (OUTPATIENT)
Dept: GENERAL RADIOLOGY | Age: 65
End: 2020-10-26
Attending: EMERGENCY MEDICINE
Payer: MEDICARE

## 2020-10-26 DIAGNOSIS — D50.9 MICROCYTIC ANEMIA: ICD-10-CM

## 2020-10-26 DIAGNOSIS — R07.89 ATYPICAL CHEST PAIN: ICD-10-CM

## 2020-10-26 DIAGNOSIS — R60.0 PEDAL EDEMA: Primary | ICD-10-CM

## 2020-10-26 LAB
ANION GAP SERPL CALC-SCNC: 8 MMOL/L (ref 7–16)
BASOPHILS # BLD: 0.1 K/UL (ref 0–0.2)
BASOPHILS NFR BLD: 1 % (ref 0–2)
BNP SERPL-MCNC: 1130 PG/ML (ref 5–125)
BUN SERPL-MCNC: 18 MG/DL (ref 8–23)
CALCIUM SERPL-MCNC: 9.1 MG/DL (ref 8.3–10.4)
CHLORIDE SERPL-SCNC: 103 MMOL/L (ref 98–107)
CO2 SERPL-SCNC: 27 MMOL/L (ref 21–32)
CREAT SERPL-MCNC: 0.9 MG/DL (ref 0.6–1)
DIFFERENTIAL METHOD BLD: ABNORMAL
EOSINOPHIL # BLD: 0.3 K/UL (ref 0–0.8)
EOSINOPHIL NFR BLD: 5 % (ref 0.5–7.8)
ERYTHROCYTE [DISTWIDTH] IN BLOOD BY AUTOMATED COUNT: 18.7 % (ref 11.9–14.6)
GLUCOSE SERPL-MCNC: 111 MG/DL (ref 65–100)
HCT VFR BLD AUTO: 32.3 % (ref 35.8–46.3)
HGB BLD-MCNC: 9.4 G/DL (ref 11.7–15.4)
IMM GRANULOCYTES # BLD AUTO: 0 K/UL (ref 0–0.5)
IMM GRANULOCYTES NFR BLD AUTO: 0 % (ref 0–5)
LYMPHOCYTES # BLD: 2.5 K/UL (ref 0.5–4.6)
LYMPHOCYTES NFR BLD: 39 % (ref 13–44)
MCH RBC QN AUTO: 21.4 PG (ref 26.1–32.9)
MCHC RBC AUTO-ENTMCNC: 29.1 G/DL (ref 31.4–35)
MCV RBC AUTO: 73.6 FL (ref 79.6–97.8)
MONOCYTES # BLD: 0.6 K/UL (ref 0.1–1.3)
MONOCYTES NFR BLD: 9 % (ref 4–12)
NEUTS SEG # BLD: 2.8 K/UL (ref 1.7–8.2)
NEUTS SEG NFR BLD: 45 % (ref 43–78)
NRBC # BLD: 0 K/UL (ref 0–0.2)
PLATELET # BLD AUTO: 476 K/UL (ref 150–450)
PMV BLD AUTO: 8.8 FL (ref 9.4–12.3)
POTASSIUM SERPL-SCNC: 3.4 MMOL/L (ref 3.5–5.1)
RBC # BLD AUTO: 4.39 M/UL (ref 4.05–5.2)
SODIUM SERPL-SCNC: 138 MMOL/L (ref 136–145)
TROPONIN-HIGH SENSITIVITY: 8.7 PG/ML (ref 0–14)
WBC # BLD AUTO: 6.2 K/UL (ref 4.3–11.1)

## 2020-10-26 PROCEDURE — 85025 COMPLETE CBC W/AUTO DIFF WBC: CPT

## 2020-10-26 PROCEDURE — 71046 X-RAY EXAM CHEST 2 VIEWS: CPT

## 2020-10-26 PROCEDURE — 83880 ASSAY OF NATRIURETIC PEPTIDE: CPT

## 2020-10-26 PROCEDURE — 84484 ASSAY OF TROPONIN QUANT: CPT

## 2020-10-26 PROCEDURE — 93005 ELECTROCARDIOGRAM TRACING: CPT | Performed by: EMERGENCY MEDICINE

## 2020-10-26 PROCEDURE — 99284 EMERGENCY DEPT VISIT MOD MDM: CPT

## 2020-10-26 PROCEDURE — 80048 BASIC METABOLIC PNL TOTAL CA: CPT

## 2020-10-26 RX ORDER — HYDROCHLOROTHIAZIDE 25 MG/1
25 TABLET ORAL DAILY
Qty: 30 TAB | Refills: 0 | Status: SHIPPED | OUTPATIENT
Start: 2020-10-26 | End: 2020-12-07 | Stop reason: SDUPTHER

## 2020-10-26 RX ORDER — FERROUS SULFATE 325(65) MG
325 TABLET, DELAYED RELEASE (ENTERIC COATED) ORAL
Qty: 90 TAB | Refills: 0 | Status: SHIPPED | OUTPATIENT
Start: 2020-10-26 | End: 2020-12-29

## 2020-10-27 VITALS
RESPIRATION RATE: 16 BRPM | TEMPERATURE: 97.6 F | WEIGHT: 134.48 LBS | HEIGHT: 67 IN | SYSTOLIC BLOOD PRESSURE: 137 MMHG | DIASTOLIC BLOOD PRESSURE: 84 MMHG | OXYGEN SATURATION: 98 % | HEART RATE: 77 BPM | BODY MASS INDEX: 21.11 KG/M2

## 2020-10-27 LAB
ATRIAL RATE: 84 BPM
CALCULATED P AXIS, ECG09: 61 DEGREES
CALCULATED R AXIS, ECG10: 50 DEGREES
CALCULATED T AXIS, ECG11: 57 DEGREES
DIAGNOSIS, 93000: NORMAL
P-R INTERVAL, ECG05: 170 MS
Q-T INTERVAL, ECG07: 428 MS
QRS DURATION, ECG06: 90 MS
QTC CALCULATION (BEZET), ECG08: 505 MS
VENTRICULAR RATE, ECG03: 84 BPM

## 2020-10-27 NOTE — ED PROVIDER NOTES
49-year-old female presents with complaints of swelling in her feet and ankles  Also has had intermittent episodes of left-sided chest discomfort radiating to her jaw    Denies fevers or chills  No vomiting or diarrhea  Patient does have a history of hypertension  Patient has noted some elevated blood pressure readings and this is had her quite concerned recently. Hypertension    This is a new problem. The current episode started 2 days ago. The problem has been gradually worsening. Associated symptoms include chest pain and peripheral edema. Pertinent negatives include no palpitations, no confusion, no malaise/fatigue, no headaches, no neck pain, no shortness of breath and no vomiting. There are no associated agents to hypertension. Ankle swelling    Pertinent negatives include no neck pain.         Past Medical History:   Diagnosis Date    Asthma     nebulizer twice daily, main inhaler and rescue inhaler- states breathing has improved significantly since med change    Former smoker     1 ppd x 8 years- quit     GERD (gastroesophageal reflux disease)     severe uncontrolle GERD esophogeal spasms/ denies hiatal hernia-- protonix twice daily, bentyl and sucralfate prn- 2-3 pillows-    History of stomach ulcers     Hypertension     Hypothyroidism     Seizures ([address]      \"caused by Compazine\"       Past Surgical History:   Procedure Laterality Date    HX BREAST AUGMENTATION      HX CERVICAL DISKECTOMY      HX  SECTION  ,     HX HEENT      jaw / steel plate and screws    HX PARTIAL HYSTERECTOMY      ovaries remain    HX RHINOPLASTY      HX ROTATOR CUFF REPAIR Left     IMPLANT BREAST SILICONE/EQ      TOTAL HIP ARTHROPLASTY Right 2007         Family History:   Problem Relation Age of Onset    Heart Disease Mother     Arrhythmia Mother     Heart Attack Mother     Ulcerative Colitis Mother     Cancer Sister     Breast Cancer Sister 43    Cancer Brother         brain    Psoriasis Father     Asthma Daughter     Breast Cancer Sister     Cancer Sister         brain cancer (sister and brother)     No Known Problems Daughter     Arthritis Daughter     Other Daughter         plantar fasciitis    Arthritis-rheumatoid Maternal Aunt     Arthritis-rheumatoid Maternal Aunt        Social History     Socioeconomic History    Marital status: SINGLE     Spouse name: Not on file    Number of children: Not on file    Years of education: Not on file    Highest education level: Not on file   Occupational History    Occupation: retired   Social Needs    Financial resource strain: Not on file    Food insecurity     Worry: Not on file     Inability: Not on file   [employer] needs     Medical: Not on file     Non-medical: Not on file   Tobacco Use    Smoking status: Former Smoker     Packs/day: 1.00     Years: 8.00     Pack years: 8.00     Types: Cigarettes     Last attempt to quit:      Years since quittin.8    Smokeless tobacco: Never Used   Substance and Sexual Activity    Alcohol use:  Yes     Alcohol/week: 1.0 standard drinks     Types: 1 Glasses of wine per week     Frequency: 2-4 times a month     Drinks per session: 1 or 2     Binge frequency: Never     Comment: cut back from 1-14 glasses    Drug use: No    Sexual activity: Not Currently     Partners: Male     Birth control/protection: None   Lifestyle    Physical activity     Days per week: 1 day     Minutes per session: 30 min    Stress: Not at all   Relationships    Social connections     Talks on phone: More than three times a week     Gets together: More than three times a week     Attends [employer]ism service: Not on file     Active member of club or organization: Not on file     Attends meetings of clubs or organizations: Never     Relationship status:     Intimate partner violence     Fear of current or ex partner: Not on file     Emotionally abused: Not on file Physically abused: Not on file     Forced sexual activity: Not on file   Other Topics Concern    Not on file   Social History Narrative    Single and lives alone. Has one dog. Works as a . Has lived in [place], [place] and [place]. ALLERGIES: Compazine [prochlorperazine]    Review of Systems   Constitutional: Negative for chills, fever and malaise/fatigue. HENT: Negative for congestion, ear pain and rhinorrhea. Eyes: Negative for photophobia and discharge. Respiratory: Negative for cough and shortness of breath. Cardiovascular: Positive for chest pain and leg swelling. Negative for palpitations. Gastrointestinal: Negative for abdominal pain, constipation, diarrhea and vomiting. Endocrine: Negative for cold intolerance and heat intolerance. Genitourinary: Negative for dysuria and flank pain. Musculoskeletal: Negative for arthralgias, myalgias and neck pain. Skin: Negative for rash and wound. Allergic/Immunologic: Negative for environmental allergies and food allergies. Neurological: Negative for syncope and headaches. Hematological: Negative for adenopathy. Does not bruise/bleed easily. Psychiatric/Behavioral: Negative for confusion and dysphoric mood. The patient is not nervous/anxious. All other systems reviewed and are negative. Vitals:    10/26/20 2110   BP: (!) 132/92   Pulse: 80   Resp: 16   Temp: 97.6 °F (36.4 °C)   SpO2: 99%   Weight: 61 kg (134 lb 7.7 oz)   Height: 5' 7\" (1.702 m)            Physical Exam  Vitals signs and nursing note reviewed. Constitutional:       General: She is in acute distress. Appearance: Normal appearance. She is well-developed and normal weight. HENT:      Head: Normocephalic and atraumatic. Right Ear: External ear normal.      Left Ear: External ear normal.      Mouth/Throat:      Mouth: Mucous membranes are moist.      Pharynx: Oropharynx is clear. No oropharyngeal exudate.    Eyes:      Extraocular Movements: Extraocular movements intact. Conjunctiva/sclera: Conjunctivae normal.      Pupils: Pupils are equal, round, and reactive to light. Neck:      Musculoskeletal: Normal range of motion and neck supple. Vascular: No JVD. Cardiovascular:      Rate and Rhythm: Normal rate and regular rhythm. Pulses: Normal pulses. Heart sounds: Normal heart sounds. No murmur. No friction rub. No gallop. Pulmonary:      Effort: Pulmonary effort is normal.      Breath sounds: Normal breath sounds. Abdominal:      General: Bowel sounds are normal. There is no distension. Palpations: Abdomen is soft. There is no mass. Tenderness: There is no abdominal tenderness. Musculoskeletal: Normal range of motion. General: No deformity. Skin:     General: Skin is warm and dry. Capillary Refill: Capillary refill takes less than 2 seconds. Findings: No rash. Neurological:      General: No focal deficit present. Mental Status: She is alert and oriented to person, place, and time. Cranial Nerves: No cranial nerve deficit. Sensory: No sensory deficit. Gait: Gait normal.   Psychiatric:         Mood and Affect: Mood normal.         Speech: Speech normal.         Behavior: Behavior normal.         Thought Content: Thought content normal.         Judgment: Judgment normal.          MDM  Number of Diagnoses or Management Options  Atypical chest pain: new and requires workup  Microcytic anemia: new and requires workup  Pedal edema: new and requires workup  Diagnosis management comments: eKG reviewed  Sinus rhythm normal axis normal intervals  PVC  No acute ischemic changes    Troponin negative  Chest x-ray unremarkable  Patient's hemoglobin is a little low for her at 9.4 appears to be microcytic in nature    Patient's BNP is slightly elevated at just over thousand.   She is not hypoxic tachypneic and has a clear chest x-ray  I will refer her to cardiology for outpatient work-up       Amount and/or Complexity of Data Reviewed  Clinical lab tests: ordered and reviewed  Tests in the radiology section of CPT®: reviewed and ordered  Tests in the medicine section of CPT®: reviewed  Decide to obtain previous medical records or to obtain history from someone other than the patient: yes  Review and summarize past medical records: yes  Independent visualization of images, tracings, or specimens: yes    Risk of Complications, Morbidity, and/or Mortality  Presenting problems: moderate  Diagnostic procedures: moderate  Management options: moderate  General comments: Elements of this note have been dictated via voice recognition software. Text and phrases may be limited by the accuracy of the software. The chart has been reviewed, but errors may still be present.       Patient Progress  Patient progress: improved         Procedures

## 2020-10-27 NOTE — DISCHARGE INSTRUCTIONS
Take medications as directed  Follow-up with cardiology  Elevate your legs as much as possible  Call your doctor/follow up doctor to set up appointment for recheck visit  Return to ER for any worsening symptoms or new problems which may arise

## 2020-10-27 NOTE — ED TRIAGE NOTES
Pt states that she woke up last night with chest pains that subsided. Now, c/o elevated blood pressure today and her right ankle swelling.   Masked on arrival.

## 2020-10-27 NOTE — ED NOTES
I have reviewed discharge instructions with the patient. The patient verbalized understanding. Patient left ED via Discharge Method: ambulatory to Home with self    Opportunity for questions and clarification provided. Patient given 2 scripts. To continue your aftercare when you leave the hospital, you may receive an automated call from our care team to check in on how you are doing. This is a free service and part of our promise to provide the best care and service to meet your aftercare needs.  If you have questions, or wish to unsubscribe from this service please call [phone]. Thank you for Choosing our [facility] Emergency Department.

## 2020-11-02 ENCOUNTER — PATIENT OUTREACH (OUTPATIENT)
Dept: CASE MANAGEMENT | Age: 65
End: 2020-11-02

## 2020-11-02 NOTE — PROGRESS NOTES
Patient contacted regarding recent discharge and COVID-19 risk. Discussed COVID-19 related testing which was not done at this time. Test results were not done. Patient informed of results, if available? Test not done    Care Transition Nurse/ Ambulatory Care Manager/ LPN Care Coordinator contacted the patient by telephone to perform post discharge assessment. Verified name and  with patient as identifiers. Patient has following risk factors of: see h&p. CTN/ACM/LPN reviewed discharge instructions, medical action plan and red flags related to discharge diagnosis. Reviewed and educated them on any new and changed medications related to discharge diagnosis. Advised obtaining a 90-day supply of all daily and as-needed medications. Advance Care Planning:   Does patient have an Advance Directive: currently not on file; education provided     Education provided regarding infection prevention, and signs and symptoms of COVID-19 and when to seek medical attention with patient who verbalized understanding. Discussed exposure protocols and quarantine from [address] you at higher risk for severe illness  and given an opportunity for questions and concerns. The patient agrees to contact the COVID-19 hotline [phone] or PCP office for questions related to their healthcare. CTN/ACM/LPN provided contact information for future reference. From CDC: Are you at higher risk for severe illness?  Wash your hands often.  Avoid close contact (6 feet, which is about two arm lengths) with people who are sick.  Put distance between yourself and other people if COVID-19 is spreading in your community.  Clean and disinfect frequently touched surfaces.  Avoid all cruise travel and non-essential air travel.  Call your healthcare professional if you have concerns about COVID-19 and your underlying condition or if you are sick.     For more information on steps you can take to protect yourself, see CDC's How to Protect Yourself      Patient/family/caregiver given information for Get[employer] Loop and agrees to enroll no  Patient's preferred e-mail:  n/a  Patient's preferred phone number: n/a  Based on Loop alert triggers, patient will be contacted by nurse care manager for worsening symptoms. Plan for follow-up call in 7-14 days based on severity of symptoms and risk factors.

## 2020-11-16 ENCOUNTER — PATIENT OUTREACH (OUTPATIENT)
Dept: CASE MANAGEMENT | Age: 65
End: 2020-11-16

## 2020-11-16 NOTE — PROGRESS NOTES
Patient resolved from Transition of Care episode on 11/16/20  Discussed COVID-19 related testing which was not done at this time. Test results were not done. Patient informed of results, if available? Test not done     Patient/family has been provided the following resources and education related to COVID-19:                         Signs, symptoms and red flags related to COVID-19            [facility] exposure and quarantine guidelines            [facility] exposure contact - [phone]            Contact for their local Department of Health                 Patient currently reports that the following symptoms have improved:  no new symptoms and no worsening symptoms. No further outreach scheduled with this CTN/ACM/LPN/HC/ MA. Episode of Care resolved. Patient has this CTN/ACM/LPN/HC/MA contact information if future needs arise.

## 2020-11-20 ENCOUNTER — HOSPITAL ENCOUNTER (OUTPATIENT)
Dept: LAB | Age: 65
Discharge: HOME | End: 2020-11-20

## 2020-11-20 PROCEDURE — 88305 TISSUE EXAM BY PATHOLOGIST: CPT

## 2020-12-03 PROBLEM — D64.9 ANEMIA: Status: ACTIVE | Noted: 2020-12-03

## 2020-12-03 PROBLEM — Z76.89 ENCOUNTER TO ESTABLISH CARE: Status: ACTIVE | Noted: 2020-12-03

## 2020-12-03 PROBLEM — I10 HYPERTENSION: Status: ACTIVE | Noted: 2020-12-03

## 2020-12-03 PROBLEM — R07.89 ATYPICAL CHEST PAIN: Status: ACTIVE | Noted: 2020-12-03

## 2021-04-07 NOTE — THERAPY DISCHARGE
Kranthi Doty has been seen in physical therapy from 8/17/2020 to 8/17/2020 for 1 visits. Treatment has been discontinued at this time due to patient failing to return for additional treatment. The below goals were met prior to discontinuation. Some goals were not met due to mentioned above.    Thank you for this referral.

## 2021-09-07 PROBLEM — G89.29 CHRONIC RIGHT SHOULDER PAIN: Status: ACTIVE | Noted: 2021-07-01

## 2021-09-07 PROBLEM — M25.511 CHRONIC RIGHT SHOULDER PAIN: Status: ACTIVE | Noted: 2021-07-01

## 2022-03-18 PROBLEM — K59.09 CONSTIPATION, CHRONIC: Status: ACTIVE | Noted: 2020-05-14

## 2022-03-18 PROBLEM — D64.9 ANEMIA: Status: ACTIVE | Noted: 2020-12-03

## 2022-03-18 PROBLEM — Z76.89 ENCOUNTER TO ESTABLISH CARE: Status: ACTIVE | Noted: 2020-12-03

## 2022-03-19 PROBLEM — Z83.719 FAMILY HISTORY OF COLONIC POLYPS: Status: ACTIVE | Noted: 2020-05-14

## 2022-03-19 PROBLEM — R79.89 ELEVATED LFTS: Status: ACTIVE | Noted: 2020-05-14

## 2022-03-19 PROBLEM — G89.29 CHRONIC RIGHT SHOULDER PAIN: Status: ACTIVE | Noted: 2021-07-01

## 2022-03-19 PROBLEM — M25.511 CHRONIC RIGHT SHOULDER PAIN: Status: ACTIVE | Noted: 2021-07-01

## 2022-03-19 PROBLEM — J45.30 MILD PERSISTENT ASTHMA WITHOUT COMPLICATION: Status: ACTIVE | Noted: 2019-03-26

## 2022-03-19 PROBLEM — Z83.71 FAMILY HISTORY OF COLONIC POLYPS: Status: ACTIVE | Noted: 2020-05-14

## 2022-03-19 PROBLEM — I10 HYPERTENSION: Status: ACTIVE | Noted: 2020-12-03

## 2022-03-20 PROBLEM — K57.30 DVRTCLOS OF LG INT W/O PERFORATION OR ABSCESS W/O BLEEDING: Status: ACTIVE | Noted: 2020-05-14

## 2022-03-20 PROBLEM — Z86.0100 HISTORY OF COLONIC POLYPS: Status: ACTIVE | Noted: 2020-05-14

## 2022-03-20 PROBLEM — R07.89 ATYPICAL CHEST PAIN: Status: ACTIVE | Noted: 2020-12-03

## 2022-03-20 PROBLEM — Z86.010 HISTORY OF COLONIC POLYPS: Status: ACTIVE | Noted: 2020-05-14

## 2022-06-14 ENCOUNTER — NURSE TRIAGE (OUTPATIENT)
Dept: OTHER | Facility: CLINIC | Age: 67
End: 2022-06-14

## 2022-06-14 NOTE — TELEPHONE ENCOUNTER
Received call from Golden Austin at [facility] with [employer]. Subjective: Caller states \"abd pain\"     Current Symptoms: vomiting blood Saturday and Sunday, abd pain right side-- lettuce from salad tasted funny on Saturday night approx 2330, pt reports that nausea vomiting resolved on Sunday, vomited blood on Sunday, diarrhea, 3 episodes of diarrhea in past 24/hours, dizziness    Onset: 3 days ago; improving    Associated Symptoms: reduced activity, reduced appetite, increased wakefulness, diarrhea    Pain Severity: 9/10; sharp, shooting; constant, waxing and waning    Temperature: denies     What has been tried: immodium increased fluids, phenergan    LMP: NA Pregnant: NA    Recommended disposition: Go to ED Now    Care advice provided, patient verbalizes understanding; denies any other questions or concerns; instructed to call back for any new or worsening symptoms. Patient/caller agrees to proceed to   Emergency Department     Attention Provider: Thank you for allowing me to participate in the care of your patient. The patient was connected to triage in response to information provided to the [facility]/PSC. Please do not respond through this encounter as the response is not directed to a shared pool.           Reason for Disposition   SEVERE abdominal pain (e.g., excruciating)    Protocols used: ABDOMINAL PAIN - [facility] - SUSY OSORIO

## 2022-07-12 ENCOUNTER — HOSPITAL ENCOUNTER (OUTPATIENT)
Dept: MAMMOGRAPHY | Age: 67
Discharge: HOME | End: 2022-07-15
Payer: MEDICARE

## 2022-07-12 DIAGNOSIS — Z12.31 VISIT FOR SCREENING MAMMOGRAM: ICD-10-CM

## 2022-07-12 PROCEDURE — 77063 BREAST TOMOSYNTHESIS BI: CPT

## 2022-07-20 ENCOUNTER — OFFICE VISIT (OUTPATIENT)
Dept: FAMILY MEDICINE CLINIC | Facility: CLINIC | Age: 67
End: 2022-07-20
Payer: MEDICARE

## 2022-07-20 VITALS
HEIGHT: 67 IN | HEART RATE: 85 BPM | SYSTOLIC BLOOD PRESSURE: 126 MMHG | DIASTOLIC BLOOD PRESSURE: 71 MMHG | BODY MASS INDEX: 19.3 KG/M2 | TEMPERATURE: 97.9 F | WEIGHT: 123 LBS

## 2022-07-20 DIAGNOSIS — N81.9 FEMALE GENITAL PROLAPSE, UNSPECIFIED TYPE: ICD-10-CM

## 2022-07-20 DIAGNOSIS — I10 PRIMARY HYPERTENSION: ICD-10-CM

## 2022-07-20 DIAGNOSIS — R07.9 INTERMITTENT CHEST PAIN: ICD-10-CM

## 2022-07-20 DIAGNOSIS — M06.00 RHEUMATOID ARTHRITIS WITH NEGATIVE RHEUMATOID FACTOR, INVOLVING UNSPECIFIED SITE (HCC): Primary | ICD-10-CM

## 2022-07-20 LAB
ALBUMIN SERPL-MCNC: 3.9 G/DL (ref 3.2–4.6)
ALBUMIN/GLOB SERPL: 1.1 {RATIO} (ref 1.2–3.5)
ALP SERPL-CCNC: 67 U/L (ref 50–136)
ALT SERPL-CCNC: 24 U/L (ref 12–65)
ANION GAP SERPL CALC-SCNC: 5 MMOL/L (ref 7–16)
AST SERPL-CCNC: 18 U/L (ref 15–37)
BILIRUB SERPL-MCNC: 0.5 MG/DL (ref 0.2–1.1)
BUN SERPL-MCNC: 23 MG/DL (ref 8–23)
CALCIUM SERPL-MCNC: 9 MG/DL (ref 8.3–10.4)
CHLORIDE SERPL-SCNC: 104 MMOL/L (ref 98–107)
CHOLEST SERPL-MCNC: 214 MG/DL
CO2 SERPL-SCNC: 25 MMOL/L (ref 21–32)
CREAT SERPL-MCNC: 1 MG/DL (ref 0.6–1)
GLOBULIN SER CALC-MCNC: 3.6 G/DL (ref 2.3–3.5)
GLUCOSE SERPL-MCNC: 96 MG/DL (ref 65–100)
HDLC SERPL-MCNC: 88 MG/DL (ref 40–60)
HDLC SERPL: 2.4 {RATIO}
LDLC SERPL CALC-MCNC: 114.6 MG/DL
POTASSIUM SERPL-SCNC: 4.5 MMOL/L (ref 3.5–5.1)
PROT SERPL-MCNC: 7.5 G/DL (ref 6.3–8.2)
SODIUM SERPL-SCNC: 134 MMOL/L (ref 136–145)
TRIGL SERPL-MCNC: 57 MG/DL (ref 35–150)
VLDLC SERPL CALC-MCNC: 11.4 MG/DL (ref 6–23)

## 2022-07-20 PROCEDURE — 3017F COLORECTAL CA SCREEN DOC REV: CPT | Performed by: NURSE PRACTITIONER

## 2022-07-20 PROCEDURE — G8420 CALC BMI NORM PARAMETERS: HCPCS | Performed by: NURSE PRACTITIONER

## 2022-07-20 PROCEDURE — 99214 OFFICE O/P EST MOD 30 MIN: CPT | Performed by: NURSE PRACTITIONER

## 2022-07-20 PROCEDURE — G8427 DOCREV CUR MEDS BY ELIG CLIN: HCPCS | Performed by: NURSE PRACTITIONER

## 2022-07-20 PROCEDURE — 1036F TOBACCO NON-USER: CPT | Performed by: NURSE PRACTITIONER

## 2022-07-20 PROCEDURE — 1123F ACP DISCUSS/DSCN MKR DOCD: CPT | Performed by: NURSE PRACTITIONER

## 2022-07-20 PROCEDURE — 1090F PRES/ABSN URINE INCON ASSESS: CPT | Performed by: NURSE PRACTITIONER

## 2022-07-20 PROCEDURE — G8399 PT W/DXA RESULTS DOCUMENT: HCPCS | Performed by: NURSE PRACTITIONER

## 2022-07-20 RX ORDER — DILTIAZEM HYDROCHLORIDE 120 MG/1
120 CAPSULE, EXTENDED RELEASE ORAL DAILY
Qty: 90 CAPSULE | Refills: 1 | Status: SHIPPED | OUTPATIENT
Start: 2022-07-20 | End: 2022-09-13 | Stop reason: ALTCHOICE

## 2022-07-20 RX ORDER — POTASSIUM CHLORIDE 750 MG/1
10 TABLET, EXTENDED RELEASE ORAL DAILY
Qty: 90 TABLET | Refills: 1 | Status: SHIPPED | OUTPATIENT
Start: 2022-07-20

## 2022-07-20 RX ORDER — PREDNISONE 1 MG/1
5 TABLET ORAL DAILY
COMMUNITY
Start: 2022-05-06

## 2022-07-20 RX ORDER — PANTOPRAZOLE SODIUM 40 MG/1
40 TABLET, DELAYED RELEASE ORAL DAILY
Qty: 90 TABLET | Refills: 1 | Status: CANCELLED | OUTPATIENT
Start: 2022-07-20

## 2022-07-20 SDOH — ECONOMIC STABILITY: FOOD INSECURITY: WITHIN THE PAST 12 MONTHS, THE FOOD YOU BOUGHT JUST DIDN'T LAST AND YOU DIDN'T HAVE MONEY TO GET MORE.: NEVER TRUE

## 2022-07-20 SDOH — ECONOMIC STABILITY: FOOD INSECURITY: WITHIN THE PAST 12 MONTHS, YOU WORRIED THAT YOUR FOOD WOULD RUN OUT BEFORE YOU GOT MONEY TO BUY MORE.: NEVER TRUE

## 2022-07-20 ASSESSMENT — PATIENT HEALTH QUESTIONNAIRE - PHQ9
2. FEELING DOWN, DEPRESSED OR HOPELESS: 0
SUM OF ALL RESPONSES TO PHQ QUESTIONS 1-9: 0
1. LITTLE INTEREST OR PLEASURE IN DOING THINGS: 0
SUM OF ALL RESPONSES TO PHQ QUESTIONS 1-9: 0
SUM OF ALL RESPONSES TO PHQ QUESTIONS 1-9: 0
SUM OF ALL RESPONSES TO PHQ9 QUESTIONS 1 & 2: 0
SUM OF ALL RESPONSES TO PHQ QUESTIONS 1-9: 0

## 2022-07-20 ASSESSMENT — SOCIAL DETERMINANTS OF HEALTH (SDOH): HOW HARD IS IT FOR YOU TO PAY FOR THE VERY BASICS LIKE FOOD, HOUSING, MEDICAL CARE, AND HEATING?: NOT HARD AT ALL

## 2022-07-20 NOTE — PROGRESS NOTES
Subjective:      Patient ID: Kemar Culp is a 74 y.o. female. Caring for 77 yo mom with heart issues stressed going back and forth to [place]. Needs a referral to gyn for abnormality seen on a ct ordered by GI  Refill of htn med  was not taking but started back due to elevated bp. Is concerned her k is low due to leg cramps   Is drinking again but not as much as before. a bottle of wine in  days  HPI    Review of Systems  Leg cramps seeing gi about stomach and chest pain seeing ortho about shoulder pain  wants to see cardiology again as was told at urgent care pulse was abnormal no chest pain pulse normal today  Objective:   Physical Exam  Vitals and nursing note reviewed. Constitutional:       Appearance: Normal appearance. Comments: thin   Cardiovascular:      Rate and Rhythm: Normal rate and regular rhythm. Pulses: Normal pulses. Heart sounds: Normal heart sounds. Pulmonary:      Effort: Pulmonary effort is normal.      Breath sounds: Normal breath sounds. Skin:     General: Skin is warm and dry. Neurological:      Mental Status: She is alert. Assessment:      /71 (Site: Left Upper Arm, Position: Sitting, Cuff Size: Medium Adult)   Pulse 85   Temp 97.9 °F (36.6 °C) (Temporal)   Ht 5' 7\" (1.702 m)   Wt 123 lb (55.8 kg)   BMI 19.26 kg/m²        Plan:      /71 (Site: Left Upper Arm, Position: Sitting, Cuff Size: Medium Adult)   Pulse 85   Temp 97.9 °F (36.6 °C) (Temporal)   Ht 5' 7\" (1.702 m)   Wt 123 lb (55.8 kg)   BMI 19.26 kg/m²      Rheumatoid arthritis with negative rheumatoid factor, involving unspecified site [facility])  Female genital prolapse, unspecified type  -     [address] [place]  Primary hypertension  -     dilTIAZem (TIAZAC) 120 MG extended release capsule; Take 1 capsule by mouth in the morning., Disp-90 capsule, R-1Normal  -     potassium chloride (KLOR-CON M) 10 MEQ extended release tablet;  Take 1 tablet by mouth in the morning.,

## 2022-07-21 ENCOUNTER — TELEPHONE (OUTPATIENT)
Dept: FAMILY MEDICINE CLINIC | Facility: CLINIC | Age: 67
End: 2022-07-21

## 2022-07-21 NOTE — TELEPHONE ENCOUNTER
----- Message from LINDA Siegel NP sent at 7/21/2022  7:50 AM EDT -----  Cholesterol is up but not over 120, improve with diet and  limit alcohol

## 2022-08-17 ENCOUNTER — HOSPITAL ENCOUNTER (OUTPATIENT)
Dept: LAB | Age: 67
Discharge: HOME | End: 2022-08-20

## 2022-08-29 NOTE — PROGRESS NOTES
[facility] CARDIOLOGY Follow Up                 Reason for Visit: Palpitations    Subjective:     Patient is a 74 y.o. female with a PMH of hypertension who presents for follow-up. The patient was last seen in December 2020. An CATHERINE was ordered for atypical chest pain. The patient did not get the study completed. The patient reports that she \"may have forgotten\" to get her stress test in the past.  She reports that she had an endoscopy \"a couple weeks ago\" where she was noted to be \"out of rhythm\" on August 18. The patient also reports an occasional chest pain in the middle of the chest. The patient does not report any alleviating or aggravating factors for the chest pain when it occurs. She also reports FERNANDEZ.       Past Medical History:   Diagnosis Date    Asthma     nebulizer twice daily, main inhaler and rescue inhaler- states breathing has improved significantly since med change    Former smoker     1 ppd x 8 years- quit 2006    GERD (gastroesophageal reflux disease)     severe uncontrolle GERD esophogeal spasms/ denies hiatal hernia-- protonix twice daily, bentyl and sucralfate prn- 2-3 pillows-    History of stomach ulcers 2008    Hypertension     Hypothyroidism     [address]iz[address] ([facility] [address] 1995     \"caused by Compazine\"      Past Surgical History:   Procedure Laterality Date    BREAST ENHANCEMENT SURGERY Bilateral over 20 yrs ago    [address]    jaw / steel plate and screws    IMPLANT BREAST SILICONE/EQ      PARTIAL HYSTERECTOMY (CERVIX NOT REMOVED)  1990    ovaries remain    [facility] [address] Left     TOTAL HIP ARTHROPLASTY Right 2007      Family History   Problem Relation Age of Onset    Cancer Sister         brain cancer (sister and brother)     No Known Problems Daughter     Arthritis Daughter     Other Daughter         plantar fasciitis    Rheum Arthritis Maternal Aunt     Breast Cancer Sister     Asthma Daughter     Psoriasis Father     Breast Cancer Sister 43    Cancer Sister     Rheum Arthritis Maternal Aunt     Heart Disease Mother     Arrhythmia Mother     Heart Attack Mother     Ulcerative Colitis Mother     Cancer Brother         brain      Social History     Tobacco Use    Smoking status: Former     Packs/day: 1.00     Years: 30.00     Pack years: 30.00     Types: Cigarettes     Start date: 10     Quit date: 2006     Years since quittin.6    Smokeless tobacco: Never   Substance Use Topics    Alcohol use: Yes     Alcohol/week: 1.0 standard drink      Allergies   Allergen Reactions    Prochlorperazine Other (See Comments)     Dystonic reaction         ROS:  No obvious pertinent positives on review of systems except for what was outlined above.        Objective:       /80   Pulse 80   Ht 5' 7\" (1.702 m)   Wt 130 lb 3.2 oz (59.1 kg)   BMI 20.39 kg/m²     BP Readings from Last 3 Encounters:   22 132/80   22 126/80   22 126/71       Wt Readings from Last 3 Encounters:   22 130 lb 3.2 oz (59.1 kg)   22 130 lb (59 kg)   22 123 lb (55.8 kg)       General/Constitutional:   Alert and oriented x 3, no acute distress  HEENT:   normocephalic, atraumatic, moist mucous membranes  Neck:   No JVD or carotid bruits bilaterally  Cardiovascular:   regular rate and rhythm, no rub/gallop appreciated  Pulmonary:   clear to auscultation bilaterally, no respiratory distress  Abdomen:   soft, non-tender, non-distended  Ext:   No sig LE edema bilaterally  Skin:  warm and dry, no obvious rashes seen  Neuro:   no obvious sensory or motor deficits  Psychiatric:   normal mood and affect      ECG:   Sinus rhythm  Normal ECG  Heart rate 80 bpm    Data Review:   Lab Results   Component Value Date    CHOL 214 (H) 2022    CHOL 155 02/15/2021    CHOL 201 (H) 2019     Lab Results   Component Value Date    TRIG 57 2022    TRIG 59 02/15/2021    TRIG 69 2019     Lab Results   Component Value Date    HDL 88 (H) 07/20/2022    HDL 72 02/15/2021     09/04/2019     Lab Results   Component Value Date    LDLCALC 114.6 (H) 07/20/2022    LDLCALC 71 02/15/2021    LDLCALC 84 09/04/2019     Lab Results   Component Value Date    LABVLDL 11.4 07/20/2022    LABVLDL 14 09/04/2019    VLDL 12 02/15/2021     Lab Results   Component Value Date    CHOLHDLRATIO 2.4 07/20/2022        Lab Results   Component Value Date/Time     07/20/2022 02:43 PM     05/26/2021 11:06 AM     02/15/2021 11:41 AM    K 4.5 07/20/2022 02:43 PM    K 4.6 05/26/2021 11:06 AM    K 5.0 02/15/2021 11:41 AM     07/20/2022 02:43 PM     05/26/2021 11:06 AM     02/15/2021 11:41 AM    CO2 25 07/20/2022 02:43 PM    CO2 22 05/26/2021 11:06 AM    CO2 23 02/15/2021 11:41 AM    BUN 23 07/20/2022 02:43 PM    BUN 23 05/26/2021 11:06 AM    BUN 20 02/15/2021 11:41 AM    CREATININE 1.00 07/20/2022 02:43 PM    CREATININE 0.89 05/26/2021 11:06 AM    CREATININE 0.83 02/15/2021 11:41 AM    GLUCOSE 96 07/20/2022 02:43 PM    GLUCOSE 76 05/26/2021 11:06 AM    GLUCOSE 81 02/15/2021 11:41 AM    CALCIUM 9.0 07/20/2022 02:43 PM    CALCIUM 9.4 05/26/2021 11:06 AM    CALCIUM 9.4 02/15/2021 11:41 AM         Lab Results   Component Value Date    ALT 24 07/20/2022    ALT 16 05/26/2021    ALT 18 02/15/2021    AST 18 07/20/2022    AST 21 05/26/2021    AST 23 02/15/2021        Assessment/Plan:   1. Hypertension, unspecified type  - Currently on diltiazem    2. Palpitations  - Obtain a ZIO for 14 days     3. FERNANDEZ (dyspnea on exertion)  - Obtain an MPI  - Obtain an echocardiogram     4. Atypical chest pain  - Obtain an MPI  - PE unlikely per PE Wells score     5.  Ill-defined condition  - Patient reports that her heart was \"out of rhythm\" during recent endoscopy  - Obtain medical records  - Obtain a ZIO for 14 days     F/U: As needed    Zana Jones MD

## 2022-08-31 ENCOUNTER — OFFICE VISIT (OUTPATIENT)
Dept: CARDIOLOGY CLINIC | Age: 67
End: 2022-08-31
Payer: MEDICARE

## 2022-08-31 ENCOUNTER — OFFICE VISIT (OUTPATIENT)
Dept: OBGYN CLINIC | Age: 67
End: 2022-08-31
Payer: MEDICARE

## 2022-08-31 VITALS
DIASTOLIC BLOOD PRESSURE: 80 MMHG | BODY MASS INDEX: 20.4 KG/M2 | HEIGHT: 67 IN | WEIGHT: 130 LBS | SYSTOLIC BLOOD PRESSURE: 126 MMHG

## 2022-08-31 VITALS
DIASTOLIC BLOOD PRESSURE: 80 MMHG | HEIGHT: 67 IN | BODY MASS INDEX: 20.44 KG/M2 | HEART RATE: 80 BPM | SYSTOLIC BLOOD PRESSURE: 132 MMHG | WEIGHT: 130.2 LBS

## 2022-08-31 DIAGNOSIS — R06.09 DOE (DYSPNEA ON EXERTION): ICD-10-CM

## 2022-08-31 DIAGNOSIS — N39.3 FEMALE STRESS INCONTINENCE: Primary | ICD-10-CM

## 2022-08-31 DIAGNOSIS — N81.10 CYSTOCELE WITH RECTOCELE: ICD-10-CM

## 2022-08-31 DIAGNOSIS — R00.2 PALPITATIONS: ICD-10-CM

## 2022-08-31 DIAGNOSIS — I10 HYPERTENSION, UNSPECIFIED TYPE: Primary | ICD-10-CM

## 2022-08-31 DIAGNOSIS — N81.6 CYSTOCELE WITH RECTOCELE: ICD-10-CM

## 2022-08-31 DIAGNOSIS — R69 ILL-DEFINED CONDITION: ICD-10-CM

## 2022-08-31 DIAGNOSIS — R07.89 ATYPICAL CHEST PAIN: ICD-10-CM

## 2022-08-31 PROCEDURE — G8399 PT W/DXA RESULTS DOCUMENT: HCPCS | Performed by: INTERNAL MEDICINE

## 2022-08-31 PROCEDURE — 93000 ELECTROCARDIOGRAM COMPLETE: CPT | Performed by: INTERNAL MEDICINE

## 2022-08-31 PROCEDURE — G8420 CALC BMI NORM PARAMETERS: HCPCS | Performed by: INTERNAL MEDICINE

## 2022-08-31 PROCEDURE — G8427 DOCREV CUR MEDS BY ELIG CLIN: HCPCS | Performed by: OBSTETRICS & GYNECOLOGY

## 2022-08-31 PROCEDURE — 3017F COLORECTAL CA SCREEN DOC REV: CPT | Performed by: INTERNAL MEDICINE

## 2022-08-31 PROCEDURE — G8399 PT W/DXA RESULTS DOCUMENT: HCPCS | Performed by: OBSTETRICS & GYNECOLOGY

## 2022-08-31 PROCEDURE — 1036F TOBACCO NON-USER: CPT | Performed by: OBSTETRICS & GYNECOLOGY

## 2022-08-31 PROCEDURE — 99214 OFFICE O/P EST MOD 30 MIN: CPT | Performed by: INTERNAL MEDICINE

## 2022-08-31 PROCEDURE — 1036F TOBACCO NON-USER: CPT | Performed by: INTERNAL MEDICINE

## 2022-08-31 PROCEDURE — 1123F ACP DISCUSS/DSCN MKR DOCD: CPT | Performed by: INTERNAL MEDICINE

## 2022-08-31 PROCEDURE — 1090F PRES/ABSN URINE INCON ASSESS: CPT | Performed by: OBSTETRICS & GYNECOLOGY

## 2022-08-31 PROCEDURE — G8420 CALC BMI NORM PARAMETERS: HCPCS | Performed by: OBSTETRICS & GYNECOLOGY

## 2022-08-31 PROCEDURE — 0509F URINE INCON PLAN DOCD: CPT | Performed by: OBSTETRICS & GYNECOLOGY

## 2022-08-31 PROCEDURE — G8427 DOCREV CUR MEDS BY ELIG CLIN: HCPCS | Performed by: INTERNAL MEDICINE

## 2022-08-31 PROCEDURE — 99204 OFFICE O/P NEW MOD 45 MIN: CPT | Performed by: OBSTETRICS & GYNECOLOGY

## 2022-08-31 PROCEDURE — 3017F COLORECTAL CA SCREEN DOC REV: CPT | Performed by: OBSTETRICS & GYNECOLOGY

## 2022-08-31 PROCEDURE — 1123F ACP DISCUSS/DSCN MKR DOCD: CPT | Performed by: OBSTETRICS & GYNECOLOGY

## 2022-08-31 PROCEDURE — 1090F PRES/ABSN URINE INCON ASSESS: CPT | Performed by: INTERNAL MEDICINE

## 2022-08-31 NOTE — PROGRESS NOTES
[place] [address] [facility] [address], [address]  [phone]    Lucy Nielsen MD, Mina Rose AYLA-BC  Tej Crowell MD, FACOG    Assessment/Plan     Patient Active Problem List    Diagnosis Date Noted    Cystocele with rectocele 08/31/2022     Priority: Medium     Overview Note:     8/31/22:  2nd degree for both on exam/valsalva       Assessment & Plan Note:     Minimal to no symptoms. D/W pt obs, Kegels and surgical intervention. Pt will cont Kegels at this point      Rheumatoid arthritis with negative rheumatoid factor, involving unspecified site ([facility] [address] 07/20/2022     Priority: Medium    Chronic right shoulder pain 07/01/2021    Anemia 12/03/2020    Encounter to establish care 12/03/2020    Hypertension 12/03/2020    Atypical chest pain 12/03/2020    Constipation, chronic 05/14/2020    Family history of colonic polyps 05/14/2020    Elevated LFTs 05/14/2020    History of colonic polyps 05/14/2020    Dvrtclos of lg int w/o perforation or abscess w/o bleeding 05/14/2020    Mild persistent asthma without complication 98/19/3807    Chronic cough 08/17/2016    Personal history of tobacco use 08/17/2016    Restless legs syndrome 08/17/2016    Arthritis      Overview Note:     osteo, neck        ADHD (attention deficit hyperactivity disorder)     Female stress incontinence 08/10/2015     Overview Note:     noted       Assessment & Plan Note:     All previous notes, labs and/or imaging performed by PCP were reviewed and confirmed with pt today. I interpreted these results and D/W pt my opinion and recommendations accordingly. D/W pt that CT results incidental finding. Discussed with patient with minimal symptomatology intervention only indicated if problem persists or worsen.   Patient understands and agrees      Insomnia, unspecified 08/10/2015    Gastroesophageal reflux disease without esophagitis 08/10/2015    External hemorrhoids with complication 24/43/6233    Internal hemorrhoids with complication     Chronic constipation 08/10/2015       Problem List Items Addressed This Visit          Digestive    Cystocele with rectocele     Minimal to no symptoms. D/W pt obs, Kegels and surgical intervention. Pt will cont Kegels at this point            Other    Female stress incontinence - Primary     All previous notes, labs and/or imaging performed by PCP were reviewed and confirmed with pt today. I interpreted these results and D/W pt my opinion and recommendations accordingly. D/W pt that CT results incidental finding. Discussed with patient with minimal symptomatology intervention only indicated if problem persists or worsen. Patient understands and agrees             Subjective     LAST PAP:  08/10/2015 Pap neg/ HPV neg     LAST MAMMO:  22     LMP:  No LMP recorded. Patient has had a hysterectomy. BIRTH CONTROL:  S/P yong Rodriguez 74 y.o. Nathaly Torrez presents today for referral for abnormal CT scan. Patient reports occasional urinary incontinence. She denies any dysuria, gross hematuria. Patient does have chronic constipation which is being managed by GI. Denies any splinting required. Patient denies any pelvic or vaginal pain. She denies any vaginal bleeding or discharge. Denies any neuro changes, visual changes, H/A, CP, SOB.          OB History    Para Term  AB Living   4       1     SAB IAB Ectopic Molar Multiple Live Births   1                # Outcome Date GA Lbr Babar/2nd Weight Sex Delivery Anes PTL Lv   4       CS-LTranv      3 SAB            2       CS-LTranv      1       Vag-Spont              Past Medical History:   Diagnosis Date    Arthritis     Asthma     nebulizer twice daily, main inhaler and rescue inhaler- states breathing has improved significantly since med change    Former smoker     1 ppd x 8 years- quit     GERD (gastroesophageal reflux disease)     severe uncontrolle GERD esophogeal spasms/ denies hiatal hernia-- protonix twice daily, bentyl and sucralfate prn- 2-3 pillows-    Hiatal hernia     History of stomach ulcers     Hypertension     Hypothyroidism     pt denies hx    Seizures ([address]      \"caused by Compazine\"            Past Surgical History:   Procedure Laterality Date    BREAST ENHANCEMENT SURGERY Bilateral over 20 yrs ago    [address]    jaw / steel plate and screws    IMPLANT BREAST SILICONE/EQ      PARTIAL HYSTERECTOMY (CERVIX NOT REMOVED)      ovaries remain    [address] Left     TOTAL HIP ARTHROPLASTY Right 2007           Family History   Problem Relation Age of Onset    Heart Disease Mother     Arrhythmia Mother     Heart Attack Mother     Ulcerative Colitis Mother     Brain Cancer Mother     Psoriasis Father     Cancer Sister         brain cancer (sister and brother)     Breast Cancer Sister 43    Cancer Brother         brain    No Known Problems Daughter     Arthritis Daughter     Other Daughter         plantar fasciitis    Asthma Daughter     Rheum Arthritis Maternal Aunt     Rheum Arthritis Maternal Aunt     Ovarian Cancer Neg Hx     Uterine Cancer Neg Hx     Colon Cancer Neg Hx            Social History     Socioeconomic History    Marital status: Single     Spouse name: Not on file    Number of children: Not on file    Years of education: Not on file    Highest education level: Not on file   Occupational History    Not on file   Tobacco Use    Smoking status: Former     Packs/day: 1.00     Years: 30.00     Pack years: 30.00     Types: Cigarettes     Start date: 10     Quit date: 2006     Years since quittin.6    Smokeless tobacco: Never   Substance and Sexual Activity    Alcohol use: Yes     Comment: 2 bottles/week    Drug use: No    Sexual activity: Yes     Partners: Male     Birth control/protection: None   Other Topics Concern    Not on file Social History Narrative    Single and lives alone. Has one dog. Works as a . Has lived in [facility], [place] and [facility].     Abuse: Feels safe at home, no history of physical abuse, no history of sexual abuse     Social Determinants of Health     Financial Resource Strain: Low Risk     Difficulty of Paying Living Expenses: Not hard at all   Food Insecurity: No Food Insecurity    Worried About Running Out of Food in the Last Year: Never true    Ran Out of Food in the Last Year: Never true   Transportation Needs: Not on file   Physical Activity: Not on file   Stress: Not on file   Social Connections: Not on file   Intimate Partner Violence: Not on file   Housing Stability: Not on file           Allergies   Allergen Reactions    Prochlorperazine Other (See Comments)     Dystonic reaction             Review of Systems    Constitutional:  No fevers or chills    Neuro:  No headaches, seizure activity    HEENT: no visual changes, bleeding gums    CV: No chest pain or palpitations    Resp: No SOB or cough    Breast:  No masses, pain, D/C, bleeding    GI:  No nausea/vomiting/diarrhea/constipation    : No dysuria or hematuria    MS:  No back, joint pain    Gyn:  As per HPI    Psych:  No depression, anxiety      Objective     Vitals:    08/31/22 1438   BP: 126/80   Site: Left Upper Arm   Position: Sitting   Weight: 130 lb (59 kg)   Height: 5' 7\" (1.702 m)          Physical Exam    General:  well developed, well nourished, in no acute distress     Head:   normocephalic and atraumatic     Lungs:  clear bilaterally with normal respirations     Heart:   regular rate and rhythm, S1, S2 without murmurs     Abdomen:  bowel sounds positive; abdomen soft and non-tender without masses, organomegaly, or hernias noted     Pelvic Exam:       External: normal female genitalia without lesions or masses       Vagina: normal without lesions or masses, 2nd degree cystocele/rectocele appreicated       Cervix: absent       Adnexa: normal bimanual exam without masses or fullness       Uterus: absent    Msk:   no deformity or scoliosis noted with normal posture and gait     Skin:   intact without lesions or rashes     Psych:  alert and cooperative; normal mood and affect; normal attention span and concentration        Hannah Lieberman MD   3:02 PM  08/31/22

## 2022-08-31 NOTE — ASSESSMENT & PLAN NOTE
All previous notes, labs and/or imaging performed by PCP were reviewed and confirmed with pt today. I interpreted these results and D/W pt my opinion and recommendations accordingly. D/W pt that CT results incidental finding. Discussed with patient with minimal symptomatology intervention only indicated if problem persists or worsen.   Patient understands and agrees

## 2022-08-31 NOTE — PROGRESS NOTES
Pt is here today to discuss CT scan which showed ? pelvic prolapse. Denies vaginal discharge, itching or odor. Denies dysuria, urgency or frequency. Only leaks urine when she runs, jumps, sneeze. Pt reports constipation. Sees GI. No diarrhea, No leaking stool. LAST PAP:  08/10/2015 Pap neg/ HPV neg    LAST MAMMO:  07/12/22    LMP:  No LMP recorded. Patient has had a hysterectomy.     BIRTH CONTROL:  none      FAMILY HISTORY OF:   Breast Cancer:  Yes   Ovarian Cancer:  No   Uterine Cancer:  No   Colon Cancer: No    Vitals:    08/31/22 1438   BP: 126/80   Site: Left Upper Arm   Position: Sitting   Weight: 130 lb (59 kg)   Height: 5' 7\" (1.702 m)       Cindy Sorto, AYLA, APRN - CNP  08/31/22  2:47 PM

## 2022-09-12 ENCOUNTER — TELEPHONE (OUTPATIENT)
Dept: CARDIOLOGY CLINIC | Age: 67
End: 2022-09-12

## 2022-09-12 NOTE — TELEPHONE ENCOUNTER
----- Message from Joann Duffy MD sent at 9/12/2022  4:12 PM EDT -----  Please let the patient know that though there was no perfusion abnormality noted on her stress test, she was noted to have a severely reduced EF. Please have her follow-up with me at my earliest availability to further discuss the results. Please let me know if there are scheduling issues.

## 2022-09-12 NOTE — PROGRESS NOTES
Cancer Sister         brain cancer (sister and brother)     Breast Cancer Sister 43    Cancer Brother         brain    No Known Problems Daughter     Arthritis Daughter     Other Daughter         plantar fasciitis    Asthma Daughter     Rheum Arthritis Maternal Aunt     Rheum Arthritis Maternal Aunt     Ovarian Cancer Neg Hx     Uterine Cancer Neg Hx     Colon Cancer Neg Hx       Social History     Tobacco Use    Smoking status: Former     Packs/day: 1.00     Years: 30.00     Pack years: 30.00     Types: Cigarettes     Start date: 10     Quit date: 2006     Years since quittin.7    Smokeless tobacco: Never   Substance Use Topics    Alcohol use: Yes     Comment: 2 bottles/week      Allergies   Allergen Reactions    Prochlorperazine Other (See Comments)     Dystonic reaction         ROS:  No obvious pertinent positives on review of systems except for what was outlined above.        Objective:       /72   Pulse 76   Ht 5' 7\" (1.702 m)   Wt 128 lb 6.4 oz (58.2 kg) Comment: with shoes  BMI 20.11 kg/m²     BP Readings from Last 3 Encounters:   22 128/72   22 (!) 150/90   22 132/80       Wt Readings from Last 3 Encounters:   22 128 lb 6.4 oz (58.2 kg)   22 130 lb (59 kg)   22 130 lb 3.2 oz (59.1 kg)       General/Constitutional:   Alert and oriented x 3, no acute distress  HEENT:   normocephalic, atraumatic, moist mucous membranes  Neck:   No JVD or carotid bruits bilaterally  Cardiovascular:   regular rate and rhythm, no rub/gallop appreciated  Pulmonary:   clear to auscultation bilaterally, no respiratory distress  Abdomen:   soft, non-tender, non-distended  Ext:   No sig LE edema bilaterally  Skin:  warm and dry, no obvious rashes seen  Neuro:   no obvious sensory or motor deficits  Psychiatric:   normal mood and affect    Data Review:   Lab Results   Component Value Date    CHOL 214 (H) 2022    CHOL 155 02/15/2021    CHOL 201 (H) 2019     Lab Results Component Value Date    TRIG 57 07/20/2022    TRIG 59 02/15/2021    TRIG 69 09/04/2019     Lab Results   Component Value Date    HDL 88 (H) 07/20/2022    HDL 72 02/15/2021     09/04/2019     Lab Results   Component Value Date    LDLCALC 114.6 (H) 07/20/2022    LDLCALC 71 02/15/2021    LDLCALC 84 09/04/2019     Lab Results   Component Value Date    LABVLDL 11.4 07/20/2022    LABVLDL 14 09/04/2019    VLDL 12 02/15/2021     Lab Results   Component Value Date    CHOLHDLRATIO 2.4 07/20/2022        Lab Results   Component Value Date/Time     07/20/2022 02:43 PM     05/26/2021 11:06 AM     02/15/2021 11:41 AM    K 4.5 07/20/2022 02:43 PM    K 4.6 05/26/2021 11:06 AM    K 5.0 02/15/2021 11:41 AM     07/20/2022 02:43 PM     05/26/2021 11:06 AM     02/15/2021 11:41 AM    CO2 25 07/20/2022 02:43 PM    CO2 22 05/26/2021 11:06 AM    CO2 23 02/15/2021 11:41 AM    BUN 23 07/20/2022 02:43 PM    BUN 23 05/26/2021 11:06 AM    BUN 20 02/15/2021 11:41 AM    CREATININE 1.00 07/20/2022 02:43 PM    CREATININE 0.89 05/26/2021 11:06 AM    CREATININE 0.83 02/15/2021 11:41 AM    GLUCOSE 96 07/20/2022 02:43 PM    GLUCOSE 76 05/26/2021 11:06 AM    GLUCOSE 81 02/15/2021 11:41 AM    CALCIUM 9.0 07/20/2022 02:43 PM    CALCIUM 9.4 05/26/2021 11:06 AM    CALCIUM 9.4 02/15/2021 11:41 AM         Lab Results   Component Value Date    ALT 24 07/20/2022    ALT 16 05/26/2021    ALT 18 02/15/2021    AST 18 07/20/2022    AST 21 05/26/2021    AST 23 02/15/2021        Assessment/Plan:   1. Cardiomyopathy, unspecified type [facility])  - Pertinent positives include COVID-19 in January 2022  - In the setting of chest pain (concern for angina) and FERNANDEZ (concern for anginal equivalent), it is reasonable to pursue an angiogram  - Obtain precath labs and an angiogram pending precath labs    - Stop diltiazem  - Start Toprol XL  - Obtain an echocardiogram at the time of [facility]  - Discussed GDMT with the patient and daughter    2. Chest pain, unspecified type  - Obtain a LHC, as above  - PE unlikely per PE Wells score     3. Dyspnea, unspecified type  - Occurring in the setting of CMP  - Obtain a LHC, as above, as well as a TTE    4.  Palpitations  - Follow up ambulatory ECG monitor    F/U: Wing Selena MD

## 2022-09-13 ENCOUNTER — OFFICE VISIT (OUTPATIENT)
Dept: CARDIOLOGY CLINIC | Age: 67
End: 2022-09-13
Payer: MEDICARE

## 2022-09-13 VITALS
HEIGHT: 67 IN | BODY MASS INDEX: 20.15 KG/M2 | DIASTOLIC BLOOD PRESSURE: 72 MMHG | SYSTOLIC BLOOD PRESSURE: 128 MMHG | HEART RATE: 76 BPM | WEIGHT: 128.4 LBS

## 2022-09-13 DIAGNOSIS — I42.9 CARDIOMYOPATHY, UNSPECIFIED TYPE (HCC): Primary | ICD-10-CM

## 2022-09-13 DIAGNOSIS — R07.9 CHEST PAIN, UNSPECIFIED TYPE: ICD-10-CM

## 2022-09-13 DIAGNOSIS — R06.00 DYSPNEA, UNSPECIFIED TYPE: ICD-10-CM

## 2022-09-13 DIAGNOSIS — R00.2 PALPITATIONS: ICD-10-CM

## 2022-09-13 DIAGNOSIS — I42.9 CARDIOMYOPATHY, UNSPECIFIED TYPE (HCC): ICD-10-CM

## 2022-09-13 PROBLEM — I20.0 ACCELERATING ANGINA (HCC): Status: ACTIVE | Noted: 2022-09-13

## 2022-09-13 LAB
ERYTHROCYTE [DISTWIDTH] IN BLOOD BY AUTOMATED COUNT: 15.1 % (ref 11.9–14.6)
HCT VFR BLD AUTO: 48.9 % (ref 35.8–46.3)
HGB BLD-MCNC: 14.9 G/DL (ref 11.7–15.4)
MCH RBC QN AUTO: 29.3 PG (ref 26.1–32.9)
MCHC RBC AUTO-ENTMCNC: 30.5 G/DL (ref 31.4–35)
MCV RBC AUTO: 96.1 FL (ref 79.6–97.8)
NRBC # BLD: 0 K/UL (ref 0–0.2)
PLATELET # BLD AUTO: 389 K/UL (ref 150–450)
PMV BLD AUTO: 9.9 FL (ref 9.4–12.3)
RBC # BLD AUTO: 5.09 M/UL (ref 4.05–5.2)
WBC # BLD AUTO: 10 K/UL (ref 4.3–11.1)

## 2022-09-13 PROCEDURE — G8427 DOCREV CUR MEDS BY ELIG CLIN: HCPCS | Performed by: INTERNAL MEDICINE

## 2022-09-13 PROCEDURE — 99214 OFFICE O/P EST MOD 30 MIN: CPT | Performed by: INTERNAL MEDICINE

## 2022-09-13 PROCEDURE — 1090F PRES/ABSN URINE INCON ASSESS: CPT | Performed by: INTERNAL MEDICINE

## 2022-09-13 PROCEDURE — G8399 PT W/DXA RESULTS DOCUMENT: HCPCS | Performed by: INTERNAL MEDICINE

## 2022-09-13 PROCEDURE — 1036F TOBACCO NON-USER: CPT | Performed by: INTERNAL MEDICINE

## 2022-09-13 PROCEDURE — G8420 CALC BMI NORM PARAMETERS: HCPCS | Performed by: INTERNAL MEDICINE

## 2022-09-13 PROCEDURE — 1123F ACP DISCUSS/DSCN MKR DOCD: CPT | Performed by: INTERNAL MEDICINE

## 2022-09-13 PROCEDURE — 3017F COLORECTAL CA SCREEN DOC REV: CPT | Performed by: INTERNAL MEDICINE

## 2022-09-13 RX ORDER — METOPROLOL SUCCINATE 25 MG/1
25 TABLET, EXTENDED RELEASE ORAL DAILY
Qty: 90 TABLET | Refills: 3 | Status: SHIPPED | OUTPATIENT
Start: 2022-09-13 | End: 2022-10-11 | Stop reason: SDUPTHER

## 2022-09-13 ASSESSMENT — PATIENT HEALTH QUESTIONNAIRE - PHQ9
SUM OF ALL RESPONSES TO PHQ9 QUESTIONS 1 & 2: 0
2. FEELING DOWN, DEPRESSED OR HOPELESS: 0
SUM OF ALL RESPONSES TO PHQ QUESTIONS 1-9: 0
SUM OF ALL RESPONSES TO PHQ QUESTIONS 1-9: 0
1. LITTLE INTEREST OR PLEASURE IN DOING THINGS: 0
SUM OF ALL RESPONSES TO PHQ QUESTIONS 1-9: 0
SUM OF ALL RESPONSES TO PHQ QUESTIONS 1-9: 0

## 2022-09-14 LAB
ANION GAP SERPL CALC-SCNC: 4 MMOL/L (ref 4–13)
BUN SERPL-MCNC: 18 MG/DL (ref 8–23)
CALCIUM SERPL-MCNC: 9.7 MG/DL (ref 8.3–10.4)
CHLORIDE SERPL-SCNC: 107 MMOL/L (ref 101–110)
CO2 SERPL-SCNC: 26 MMOL/L (ref 21–32)
CREAT SERPL-MCNC: 0.9 MG/DL (ref 0.6–1)
GLUCOSE SERPL-MCNC: 87 MG/DL (ref 65–100)
POTASSIUM SERPL-SCNC: 4.4 MMOL/L (ref 3.5–5.1)
SODIUM SERPL-SCNC: 137 MMOL/L (ref 136–145)

## 2022-09-19 ENCOUNTER — TELEPHONE (OUTPATIENT)
Dept: CARDIOLOGY CLINIC | Age: 67
End: 2022-09-19

## 2022-09-20 NOTE — PROGRESS NOTES
Patient pre-assessment complete for [facility] scheduled for 22, arrival time 0600. Patient verified using . Patient instructed to bring a list of all home medications on the day of procedure. NPO status reinforced. Patient informed to take a full dose aspirin 325mg  or 81 mg x 4 on the day of procedure. Instructed they can take all other medications excluding vitamins & supplements. Patient verbalizes understanding of all instructions & denies any questions at this time.

## 2022-09-21 ENCOUNTER — HOSPITAL ENCOUNTER (OUTPATIENT)
Age: 67
Setting detail: OUTPATIENT SURGERY
Discharge: HOME | End: 2022-09-21
Attending: INTERNAL MEDICINE | Admitting: INTERNAL MEDICINE
Payer: MEDICARE

## 2022-09-21 ENCOUNTER — APPOINTMENT (OUTPATIENT)
Dept: NON INVASIVE DIAGNOSTICS | Age: 67
End: 2022-09-21
Attending: INTERNAL MEDICINE
Payer: MEDICARE

## 2022-09-21 VITALS
TEMPERATURE: 97.9 F | RESPIRATION RATE: 14 BRPM | WEIGHT: 128 LBS | SYSTOLIC BLOOD PRESSURE: 138 MMHG | OXYGEN SATURATION: 97 % | HEIGHT: 67 IN | BODY MASS INDEX: 20.09 KG/M2 | DIASTOLIC BLOOD PRESSURE: 77 MMHG | HEART RATE: 53 BPM

## 2022-09-21 DIAGNOSIS — R07.9 CHEST PAIN: Primary | ICD-10-CM

## 2022-09-21 DIAGNOSIS — I20.0 ACCELERATING ANGINA (HCC): ICD-10-CM

## 2022-09-21 PROBLEM — I50.22 CHRONIC SYSTOLIC CHF (CONGESTIVE HEART FAILURE) (HCC): Status: ACTIVE | Noted: 2022-09-21

## 2022-09-21 LAB
ECHO AO ASC DIAM: 2.9 CM
ECHO AO ASCENDING AORTA INDEX: 1.74 CM/M2
ECHO AO ROOT DIAM: 2.9 CM
ECHO AO ROOT INDEX: 1.74 CM/M2
ECHO AV AREA PEAK VELOCITY: 2 CM2
ECHO AV AREA VTI: 2.1 CM2
ECHO AV AREA/BSA PEAK VELOCITY: 1.2 CM2/M2
ECHO AV AREA/BSA VTI: 1.3 CM2/M2
ECHO AV MEAN GRADIENT: 3 MMHG
ECHO AV MEAN VELOCITY: 0.8 M/S
ECHO AV PEAK GRADIENT: 6 MMHG
ECHO AV PEAK VELOCITY: 1.3 M/S
ECHO AV VELOCITY RATIO: 0.77
ECHO AV VTI: 27.3 CM
ECHO BSA: 1.66 M2
ECHO BSA: 1.66 M2
ECHO EST RA PRESSURE: 3 MMHG
ECHO IVC PROX: 1.2 CM
ECHO LA AREA 2C: 14.8 CM2
ECHO LA AREA 4C: 14.8 CM2
ECHO LA DIAMETER INDEX: 1.92 CM/M2
ECHO LA DIAMETER: 3.2 CM
ECHO LA MAJOR AXIS: 4.8 CM
ECHO LA MINOR AXIS: 4.4 CM
ECHO LA TO AORTIC ROOT RATIO: 1.1
ECHO LA VOL BP: 40 ML (ref 22–52)
ECHO LA VOL/BSA BIPLANE: 24 ML/M2 (ref 16–34)
ECHO LV E' LATERAL VELOCITY: 6 CM/S
ECHO LV E' SEPTAL VELOCITY: 5 CM/S
ECHO LV EDV A2C: 164 ML
ECHO LV EDV A4C: 153 ML
ECHO LV EDV INDEX A4C: 92 ML/M2
ECHO LV EDV NDEX A2C: 98 ML/M2
ECHO LV EJECTION FRACTION A2C: 42 %
ECHO LV EJECTION FRACTION A4C: 43 %
ECHO LV EJECTION FRACTION BIPLANE: 43 % (ref 55–100)
ECHO LV ESV A2C: 94 ML
ECHO LV ESV A4C: 87 ML
ECHO LV ESV INDEX A2C: 56 ML/M2
ECHO LV ESV INDEX A4C: 52 ML/M2
ECHO LV FRACTIONAL SHORTENING: 15 % (ref 28–44)
ECHO LV INTERNAL DIMENSION DIASTOLE INDEX: 3.23 CM/M2
ECHO LV INTERNAL DIMENSION DIASTOLIC: 5.4 CM (ref 3.9–5.3)
ECHO LV INTERNAL DIMENSION SYSTOLIC INDEX: 2.75 CM/M2
ECHO LV INTERNAL DIMENSION SYSTOLIC: 4.6 CM
ECHO LV IVSD: 0.8 CM (ref 0.6–0.9)
ECHO LV MASS 2D: 180.1 G (ref 67–162)
ECHO LV MASS INDEX 2D: 107.9 G/M2 (ref 43–95)
ECHO LV POSTERIOR WALL DIASTOLIC: 1 CM (ref 0.6–0.9)
ECHO LV RELATIVE WALL THICKNESS RATIO: 0.37
ECHO LVOT AREA: 2.5 CM2
ECHO LVOT AV VTI INDEX: 0.81
ECHO LVOT DIAM: 1.8 CM
ECHO LVOT MEAN GRADIENT: 2 MMHG
ECHO LVOT PEAK GRADIENT: 4 MMHG
ECHO LVOT PEAK VELOCITY: 1 M/S
ECHO LVOT STROKE VOLUME INDEX: 33.8 ML/M2
ECHO LVOT SV: 56.5 ML
ECHO LVOT VTI: 22.2 CM
ECHO MV A VELOCITY: 1.06 M/S
ECHO MV E DECELERATION TIME (DT): 220 MS
ECHO MV E VELOCITY: 0.9 M/S
ECHO MV E/A RATIO: 0.85
ECHO MV E/E' LATERAL: 15
ECHO MV E/E' RATIO (AVERAGED): 16.5
ECHO MV E/E' SEPTAL: 18
ECHO MV REGURGITANT PEAK GRADIENT: 77 MMHG
ECHO MV REGURGITANT PEAK VELOCITY: 4.4 M/S
ECHO RIGHT VENTRICULAR SYSTOLIC PRESSURE (RVSP): 17 MMHG
ECHO RV BASAL DIMENSION: 3.2 CM
ECHO RV TAPSE: 2 CM (ref 1.7–?)
ECHO TV REGURGITANT MAX VELOCITY: 1.85 M/S
ECHO TV REGURGITANT PEAK GRADIENT: 14 MMHG
EKG ATRIAL RATE: 61 BPM
EKG DIAGNOSIS: NORMAL
EKG P AXIS: 59 DEGREES
EKG P-R INTERVAL: 176 MS
EKG Q-T INTERVAL: 462 MS
EKG QRS DURATION: 84 MS
EKG QTC CALCULATION (BAZETT): 465 MS
EKG R AXIS: 4 DEGREES
EKG T AXIS: 43 DEGREES
EKG VENTRICULAR RATE: 61 BPM

## 2022-09-21 PROCEDURE — 6370000000 HC RX 637 (ALT 250 FOR IP): Performed by: INTERNAL MEDICINE

## 2022-09-21 PROCEDURE — 93458 L HRT ARTERY/VENTRICLE ANGIO: CPT | Performed by: INTERNAL MEDICINE

## 2022-09-21 PROCEDURE — 99152 MOD SED SAME PHYS/QHP 5/>YRS: CPT | Performed by: INTERNAL MEDICINE

## 2022-09-21 PROCEDURE — 6360000004 HC RX CONTRAST MEDICATION: Performed by: INTERNAL MEDICINE

## 2022-09-21 PROCEDURE — C1769 GUIDE WIRE: HCPCS | Performed by: INTERNAL MEDICINE

## 2022-09-21 PROCEDURE — C8929 TTE W OR WO FOL WCON,DOPPLER: HCPCS

## 2022-09-21 PROCEDURE — 93306 TTE W/DOPPLER COMPLETE: CPT | Performed by: INTERNAL MEDICINE

## 2022-09-21 PROCEDURE — 2500000003 HC RX 250 WO HCPCS: Performed by: INTERNAL MEDICINE

## 2022-09-21 PROCEDURE — 2580000003 HC RX 258: Performed by: INTERNAL MEDICINE

## 2022-09-21 PROCEDURE — 2709999900 HC NON-CHARGEABLE SUPPLY: Performed by: INTERNAL MEDICINE

## 2022-09-21 PROCEDURE — 93005 ELECTROCARDIOGRAM TRACING: CPT | Performed by: INTERNAL MEDICINE

## 2022-09-21 PROCEDURE — C1894 INTRO/SHEATH, NON-LASER: HCPCS | Performed by: INTERNAL MEDICINE

## 2022-09-21 PROCEDURE — C1887 CATHETER, GUIDING: HCPCS | Performed by: INTERNAL MEDICINE

## 2022-09-21 PROCEDURE — 6360000002 HC RX W HCPCS: Performed by: INTERNAL MEDICINE

## 2022-09-21 RX ORDER — LIDOCAINE HYDROCHLORIDE 10 MG/ML
INJECTION, SOLUTION INFILTRATION; PERINEURAL PRN
Status: DISCONTINUED | OUTPATIENT
Start: 2022-09-21 | End: 2022-09-21 | Stop reason: HOSPADM

## 2022-09-21 RX ORDER — ASPIRIN 81 MG/1
324 TABLET, CHEWABLE ORAL ONCE
Status: CANCELLED | OUTPATIENT
Start: 2022-09-21 | End: 2022-09-21

## 2022-09-21 RX ORDER — ASPIRIN 81 MG/1
324 TABLET, CHEWABLE ORAL ONCE
Status: COMPLETED | OUTPATIENT
Start: 2022-09-21 | End: 2022-09-21

## 2022-09-21 RX ORDER — ACETAMINOPHEN 325 MG/1
650 TABLET ORAL EVERY 4 HOURS PRN
Status: DISCONTINUED | OUTPATIENT
Start: 2022-09-21 | End: 2022-09-21 | Stop reason: HOSPADM

## 2022-09-21 RX ORDER — SODIUM CHLORIDE 0.9 % (FLUSH) 0.9 %
5-40 SYRINGE (ML) INJECTION EVERY 12 HOURS SCHEDULED
Status: DISCONTINUED | OUTPATIENT
Start: 2022-09-21 | End: 2022-09-21 | Stop reason: HOSPADM

## 2022-09-21 RX ORDER — HEPARIN SODIUM 200 [USP'U]/100ML
INJECTION, SOLUTION INTRAVENOUS CONTINUOUS PRN
Status: DISCONTINUED | OUTPATIENT
Start: 2022-09-21 | End: 2022-09-21 | Stop reason: HOSPADM

## 2022-09-21 RX ORDER — DIAZEPAM 5 MG/1
5 TABLET ORAL EVERY 6 HOURS PRN
Status: DISCONTINUED | OUTPATIENT
Start: 2022-09-21 | End: 2022-09-21 | Stop reason: HOSPADM

## 2022-09-21 RX ORDER — SODIUM CHLORIDE 0.9 % (FLUSH) 0.9 %
5-40 SYRINGE (ML) INJECTION PRN
Status: DISCONTINUED | OUTPATIENT
Start: 2022-09-21 | End: 2022-09-21 | Stop reason: HOSPADM

## 2022-09-21 RX ORDER — MIDAZOLAM HYDROCHLORIDE 1 MG/ML
INJECTION INTRAMUSCULAR; INTRAVENOUS PRN
Status: DISCONTINUED | OUTPATIENT
Start: 2022-09-21 | End: 2022-09-21 | Stop reason: HOSPADM

## 2022-09-21 RX ORDER — SODIUM CHLORIDE 9 MG/ML
INJECTION, SOLUTION INTRAVENOUS CONTINUOUS
Status: DISCONTINUED | OUTPATIENT
Start: 2022-09-21 | End: 2022-09-21 | Stop reason: HOSPADM

## 2022-09-21 RX ADMIN — DIAZEPAM 5 MG: 5 TABLET ORAL at 06:26

## 2022-09-21 RX ADMIN — PERFLUTREN 0.45 ML: 6.52 INJECTION, SUSPENSION INTRAVENOUS at 08:02

## 2022-09-21 RX ADMIN — ASPIRIN 324 MG: 81 TABLET, CHEWABLE ORAL at 06:26

## 2022-09-21 NOTE — PROGRESS NOTES
TRANSFER - IN REPORT:    Verbal report received from [facility], RN on [facility] being received from [facility] for routine post-op      Report consisted of patients Situation, Background, Assessment and Recommendations(SBAR). Information from the following report(s) SBAR, Procedure Summary, MAR, and Cardiac Rhythm SB  was reviewed with the receiving nurse. Opportunity for questions and clarification was provided. Assessment completed upon patients arrival to unit and care assumed.

## 2022-09-21 NOTE — PROGRESS NOTES
Patient received to [address] [place] [address] room # 10. Ambulatory from [facility]. Patient scheduled for [facility] today with Dr Yuridia Veliz. Procedure reviewed & questions answered, voiced good understanding consent obtained & placed on chart. All medications and medical history reviewed. Will prep patient per orders. Patient & family updated on plan of care. The patient has a fraility score of 3-MANAGING WELL, based on ability to perform ADLs. Patient took 324mg ASA at 0626 upon arrival to [address] [place] [address].

## 2022-09-21 NOTE — PROGRESS NOTES
[address], [address] [address]  Ashley Acuña, [address] [place] [address]  PHONE: [phone]      To Whom It May Concern:          Graciela NobleHoward Khalil was in our facility for a scheduled procedure on 9/21/22 and is restricted from using Right Hand. Mrs. Kd Khalil may return to work on Monday, 9/26/22 . Please consider this an excused absence. If you have any questions, please feel free to contact [facility], Dr. Ran Littleist at [phone]. Thank you.

## 2022-09-21 NOTE — PROGRESS NOTES
[facility] by   Right radial access  No interventions  Right wrist TR band with 15ml  Versed 4mg IV  Fentanyl 50mcg IV  Access site soft. Clean, dry, and intact; with no signs of bleeding or hematoma  Pt. Tolerated procedure  TRANSFER - OUT REPORT:    Verbal report given to Flaco on Tramaine Search  being transferred to [facility]  for routine progression of patient care       Report consisted of patient's Situation, Background, Assessment and   Recommendations(SBAR). Information from the following report(s) Nurse Handoff Report and MAR was reviewed with the receiving nurse. Lines:   Peripheral IV 09/21/22 Right Antecubital (Active)       Peripheral IV 09/21/22 Left Antecubital (Active)        Opportunity for questions and clarification was provided.       Patient transported with:  Registered Nurse

## 2022-09-21 NOTE — Clinical Note
Contrast Dose Calculator:   Patient's age: 74.   Patient's sex: Female. Patient weight (kg) = 128. Creatinine level (mg/dL) = 0.9. Creatinine clearance (mL/min): 121. Contrast concentration (mg/mL) = 370. MACD = 300 mL. Max Contrast dose per Creatinine Cl calculator = 272.25 mL.

## 2022-09-21 NOTE — DISCHARGE INSTRUCTIONS
HEART CATHETERIZATION/ANGIOGRAPHY DISCHARGE INSTRUCTIONS    Check puncture site frequently for swelling or bleeding. If there is any bleeding, apply pressure over the area with a clean towel or washcloth and call 911. Notify your doctor for any redness, swelling, drainage, or oozing from the puncture site. Notify your doctor for any fever or chills. If the extremity becomes cold, numb, or painful call Dr. Juliane Allen at [phone]. Activity should be limited for the next 48 hours. No heavy lifting, pushing, pulling  or strenuous activity for 48 hours. No heavy lifting (anything over 10 pounds) for 3 days. You may resume your usual diet. Drink more fluids than usual.  Have a responsible person drive you home and stay with you for at least 24 hours after your heart catheterization/angiography. You may remove bandage from your Right Arm in 24 hours. You may shower in 24 hours. No tub baths, hot tubs, or swimming for 1 week. Do not place any lotions, creams, powders, or ointments over puncture site for 1 week. You may place a clean band-aid over the puncture site each day for 5 days. Change daily. Sedation for a Medical Procedure: Care Instructions     You were given a sedative medication during your visit. While many of the effects will have worn   off before you leave; you may continue to feel some effects for several hours. Common side effects from sedation include:  Feeling sleepy. (Your doctors and nurses will make sure you are not too sleepy to go home.)  Nausea and vomiting. This usually does not last long. Feeling tired. How can you care for yourself at home? Activity    Don't do anything for 24 hours that requires attention to detail. It takes time for the medicine effects to completely wear off. Do not make important legal decisions for 24 hours. Do not sign any legal documents for 24 hours.      Do not drink alcohol today     For your safety, you should not drive or operate heavy machinery for the remainder of the day     Rest when you feel tired. Getting enough sleep will help you recover. Diet    You can eat your normal diet, unless your doctor gives you other instructions. If your stomach is upset, try clear liquids and bland, low-fat foods like plain toast or rice. Drink plenty of fluids (unless your doctor tells you not to). Don't drink alcohol for 24 hours. Medicines    Be safe with medicines. Read and follow all instructions on the label. If the doctor gave you a prescription medicine for pain, take it as prescribed. If you are not taking a prescription pain medicine, ask your doctor if you can take an over-the-counter medicine. If you think your pain medicine is making you sick to your stomach: Take your medicine after meals (unless your doctor has told you not to). Ask your doctor for a different pain medicine. I have read the above instructions and have had the opportunity to ask questions.       Patient: ________________________   Date: _____________    Witness: _______________________   Date: _____________

## 2022-09-29 NOTE — PROGRESS NOTES
(11.3%), rare brief AT, longest 2.9 seconds     NST - anterior artifact, EF 29%     Echo - EF 35-40%, dilated LV     LHC - normal coronaries, EF 25-30%, LVE, EDP 12          Key CAD CHF Meds            sacubitril-valsartan (ENTRESTO) 24-26 MG per tablet (Taking)    Sig - Route: Take 1 tablet by mouth 2 times daily - Oral    Class: NO PRINT    metoprolol succinate (TOPROL XL) 25 MG extended release tablet (Taking)    Sig - Route: Take 1 tablet by mouth daily - Oral              Past Medical History, Past Surgical History, Family history, Social History, and Medications were all reviewed with the patient today and updated as necessary. Prior to Admission medications    Medication Sig Start Date End Date Taking? Authorizing Provider   sacubitril-valsartan (ENTRESTO) 24-26 MG per tablet Take 1 tablet by mouth 2 times daily 9/30/22  Yes Perfecto Garcia MD   linaCLOtide (LINZESS PO) Take by mouth every morning   Yes Historical Provider, MD   metoprolol succinate (TOPROL XL) 25 MG extended release tablet Take 1 tablet by mouth daily 9/13/22  Yes Marcia Bates MD   predniSONE (DELTASONE) 5 MG tablet Take 5 mg by mouth in the morning.  5/6/22  Yes Zakia Montez MD   potassium chloride (KLOR-CON M) 10 MEQ extended release tablet Take 1 tablet by mouth in the morning. 7/20/22  Yes LINDA Siegel NP   albuterol sulfate  (90 Base) MCG/ACT inhaler Inhale 2 puffs into the lungs every 4 hours as needed 1/20/22  Yes Ar Automatic Reconciliation   azelastine (ASTELIN) 0.1 % nasal spray 1 spray by Nasal route 2 times daily 5/21/19  Yes Ar Automatic Reconciliation   cetirizine (ZYRTEC) 10 MG tablet Take 10 mg by mouth daily 3/20/19  Yes Ar Automatic Reconciliation   montelukast (SINGULAIR) 10 MG tablet Take 10 mg by mouth daily 1/20/22  Yes LINDA Siegel NP   pantoprazole (PROTONIX) 40 MG tablet Take 40 mg by mouth daily 1/20/22  Yes LINDA Siegel NP     Allergies   Allergen Reactions    Prochlorperazine Other (See Comments)     Dystonic reaction     Past Medical History:   Diagnosis Date    Alcohol abuse     Arthritis     Asthma     nebulizer twice daily, main inhaler and rescue inhaler- states breathing has improved significantly since med change    Former smoker     1 ppd x 8 years- quit 2006    GERD (gastroesophageal reflux disease)     severe uncontrolle GERD esophogeal spasms/ denies hiatal hernia-- protonix twice daily, bentyl and sucralfate prn- 2-3 pillows-    Hiatal hernia     History of stomach ulcers 2008    Hypertension     Hypothyroidism     pt denies hx    Seizures ([address] 1995     \"caused by Compazine\"     Past Surgical History:   Procedure Laterality Date    BREAST ENHANCEMENT SURGERY Bilateral over 20 yrs ago    [address] N/A 9/21/2022    Left heart cath / coronary angiography performed by Mechelle Garcia MD at [address] [place] [address]    jaw / steel plate and screws    IMPLANT BREAST SILICONE/EQ      PARTIAL HYSTERECTOMY (CERVIX NOT REMOVED)  1990    ovaries remain    [facility] [address] Left     TOTAL HIP ARTHROPLASTY Right 2007     Family History   Problem Relation Age of Onset    Heart Disease Mother     Arrhythmia Mother     Heart Attack Mother     Ulcerative Colitis Mother     Brain Cancer Mother     Psoriasis Father     Cancer Sister         brain cancer (sister and brother)     Breast Cancer Sister 43    Cancer Brother         brain    No Known Problems Daughter     Arthritis Daughter     Other Daughter         plantar fasciitis    Asthma Daughter     Rheum Arthritis Maternal Aunt     Rheum Arthritis Maternal Aunt     Ovarian Cancer Neg Hx     Uterine Cancer Neg Hx     Colon Cancer Neg Hx      Social History     Tobacco Use    Smoking status: Former     Packs/day: 1.00     Years: 30.00     Pack years: 30.00     Types: Cigarettes Start date:      Quit date: 2006     Years since quittin.7    Smokeless tobacco: Never   Substance Use Topics    Alcohol use: Yes     Comment: several bottles of vodka weekly. Sep 2022 reduced to 2 bottles wine weekly by self report       ROS:    Review of Systems   Constitutional: Positive for malaise/fatigue. Cardiovascular:  Positive for chest pain. Respiratory:  Positive for shortness of breath. Negative for sleep disturbances due to breathing. Psychiatric/Behavioral:  The patient is nervous/anxious. PHYSICAL EXAM:   /88   Pulse 64   Ht 5' 7\" (1.702 m)   Wt 133 lb 6 oz (60.5 kg)   BMI 20.89 kg/m²      Wt Readings from Last 3 Encounters:   22 133 lb 6 oz (60.5 kg)   22 128 lb (58.1 kg)   22 128 lb 6.4 oz (58.2 kg)     BP Readings from Last 3 Encounters:   22 138/88   22 138/77   22 128/72     Pulse Readings from Last 3 Encounters:   22 64   22 53   22 76           Physical Exam  Constitutional:       Appearance: Normal appearance. HENT:      Head: Normocephalic and atraumatic. Eyes:      Conjunctiva/sclera: Conjunctivae normal.   Neck:      Vascular: No carotid bruit. Cardiovascular:      Rate and Rhythm: Normal rate and regular rhythm. Heart sounds: No murmur heard. No friction rub. No gallop. Pulmonary:      Effort: No respiratory distress. Breath sounds: No wheezing or rales. Abdominal:      General: Abdomen is flat. There is no distension. Palpations: Abdomen is soft. Tenderness: There is no abdominal tenderness. Musculoskeletal:         General: No swelling. Cervical back: Neck supple. Skin:     General: Skin is warm and dry. Neurological:      General: No focal deficit present. Mental Status: She is alert. Psychiatric:         Mood and Affect: Mood is anxious. Affect is tearful.          Behavior: Behavior normal.       Medical problems and test results were reviewed with the patient today. DATA REVIEW    LIPID PANEL     Lab Results   Component Value Date    CHOL 214 (H) 07/20/2022    CHOL 155 02/15/2021    CHOL 201 (H) 09/04/2019     Lab Results   Component Value Date    TRIG 57 07/20/2022    TRIG 59 02/15/2021    TRIG 69 09/04/2019     Lab Results   Component Value Date    HDL 88 (H) 07/20/2022    HDL 72 02/15/2021     09/04/2019     Lab Results   Component Value Date    LDLCALC 114.6 (H) 07/20/2022    LDLCALC 71 02/15/2021    LDLCALC 84 09/04/2019     Lab Results   Component Value Date    LABVLDL 11.4 07/20/2022    LABVLDL 14 09/04/2019    VLDL 12 02/15/2021     Lab Results   Component Value Date    CHOLHDLRATIO 2.4 07/20/2022       BMP  Lab Results   Component Value Date/Time     09/13/2022 09:15 AM    K 4.4 09/13/2022 09:15 AM     09/13/2022 09:15 AM    CO2 26 09/13/2022 09:15 AM    BUN 18 09/13/2022 09:15 AM    CREATININE 0.90 09/13/2022 09:15 AM    GLUCOSE 87 09/13/2022 09:15 AM    CALCIUM 9.7 09/13/2022 09:15 AM      Lab Results   Component Value Date    WBC 10.0 09/13/2022    HGB 14.9 09/13/2022    HCT 48.9 (H) 09/13/2022    MCV 96.1 09/13/2022     09/13/2022 2020- normal sed rate    EKG        CXR/IMAGING        DEVICE INTERROGATION        OUTSIDE RECORDS REVIEW    Records from outside providers have been reviewed and summarized as noted in the HPI, past history and data review sections of this note, and reviewed with patient. .       ASSESSMENT and PLAN    Pa Carl was seen today for follow-up from hospital.    Diagnoses and all orders for this visit:    Chronic systolic CHF (congestive heart failure) ([address]  -     Basic Metabolic Panel; Future    Dilated cardiomyopathy (HCC)  -     Magnesium; Future    Alcohol abuse  -     [address] (Counseling)    Other orders  -     sacubitril-valsartan (ENTRESTO) 24-26 MG per tablet;  Take 1 tablet by mouth 2 times daily        IMPRESSION:    NYHA II, discussed pathophysiology, functional class, etiologies and treatment plans in detail. Possible etiologies are RA, stress and PVCs (though 11% not typically high enough to do this), and most likely cause combination of alcohol and stress. Resting HR 64. Add Entresto, discussed importance of labs, taking supplemental K+ which we may need to stop, will add a Mg level to those labs since this could contribute to difficulty replacing K+    She agreed to counseling referral to help address her anxiety. We had a sydney discussion today about alcohol use and its effects on causing and perpetuating heart failure by cellular apoptosis. Have clearly cautioned her that given her history with it, there is no level of consumption that would be safe, and have urged her to seek help for alcohol use disorder. This is obviously difficult for her to hear, was clear it will require heavy emotional work on her part but I'm confident she can beat it with skilled help. After highest tolerated med doses for > 90 days will repeat limited echo to assess for device recommendations. Return in about 3 months (around 12/30/2022) for 50 VISIT 2 WEEKS FOR ENTRESTO TITRATION AND BMP. Thank you for allowing me to participate in this patient's care. Please call or contact me if there are any questions or concerns regarding the above.       Brittany Cortes MD  09/30/22  9:09 AM

## 2022-09-29 NOTE — PATIENT INSTRUCTIONS
Patient Education        Heart Failure: Care Instructions  Your Care Instructions     Heart failure occurs when your heart does not pump as much blood as the body needs. Failure does not mean that the heart has stopped pumping but rather that it is not pumping as well as it should. Over time, this causes fluid buildup in your lungs and other parts of your body. Fluid buildup can cause shortness of breath, fatigue, swollen ankles, and other problems. By taking medicines regularly, reducing sodium (salt) in your diet, checking your weight every day, and making lifestyle changes, you can feel better and live longer. Follow-up care is a key part of your treatment and safety. Be sure to make and go to all appointments, and call your doctor if you are having problems. It's also a good idea to know your test results and keep a list of the medicines you take. How can you care for yourself at home? Medicines    Be safe with medicines. Take your medicines exactly as prescribed. Call your doctor if you think you are having a problem with your medicine. Do not take any vitamins, over-the-counter medicine, or herbal products without talking to your doctor first. Helga Ayalaron not take ibuprofen (Advil or Motrin) and naproxen (Aleve) without talking to your doctor first. They could make your heart failure worse. You may take some of the following medicine. Angiotensin-converting enzyme inhibitors (ACEIs) or angiotensin II receptor blockers (ARBs) reduce the heart's workload, lower blood pressure, and reduce swelling. Taking an ACEI or ARB may lower your chance of needing to be hospitalized. Beta-blockers can slow heart rate, decrease blood pressure, and improve your condition. Taking a beta-blocker may lower your chance of needing to be hospitalized. Diuretics, also called water pills, reduce swelling. You will get more details on the specific medicines your doctor prescribes.   Diet    Your doctor may suggest that you limit sodium. Your doctor can tell you how much sodium is right for you. An example is less than 3,000 mg a day. This includes all the salt you eat in cooking or in packaged foods. People get most of their sodium from processed foods. Fast food and restaurant meals also tend to be very high in sodium. Ask your doctor how much liquid you can drink each day. You may have to limit liquids. Weight    Weigh yourself without clothing at the same time each day. Record your weight. Call your doctor if you have a sudden weight gain, such as more than 2 to 3 pounds in a day or 5 pounds in a week. (Your doctor may suggest a different range of weight gain.) A sudden weight gain may mean that your heart failure is getting worse. Activity level    Start light exercise (if your doctor says it is okay). Even if you can only do a small amount, exercise will help you get stronger, have more energy, and manage your weight and your stress. Walking is an easy way to get exercise. Start out by walking a little more than you did before. Bit by bit, increase the amount you walk. When you exercise, watch for signs that your heart is working too hard. You are pushing yourself too hard if you cannot talk while you are exercising. If you become short of breath or dizzy or have chest pain, stop, sit down, and rest.     If you feel \"wiped out\" the day after you exercise, walk slower or for a shorter distance until you can work up to a better pace. Get enough rest at night. Sleeping with 1 or 2 pillows under your upper body and head may help you breathe easier. Lifestyle changes    Do not smoke. Smoking can make a heart condition worse. If you need help quitting, talk to your doctor about stop-smoking programs and medicines. These can increase your chances of quitting for good. Quitting smoking may be the most important step you can take to protect your heart. Limit alcohol to 2 drinks a day for men and 1 drink a day for women. Too much alcohol can cause health problems. Avoid getting sick from colds and the flu. Get a pneumococcal vaccine shot. If you have had one before, ask your doctor whether you need another dose. Get a flu shot each year. If you must be around people with colds or the flu, wash your hands often. When should you call for help? Call 911  if you have symptoms of sudden heart failure such as: You have severe trouble breathing. You cough up pink, foamy mucus. You have a new irregular or rapid heartbeat. Call your doctor now or seek immediate medical care if:    You have new or increased shortness of breath. You are dizzy or lightheaded, or you feel like you may faint. You have sudden weight gain, such as more than 2 to 3 pounds in a day or 5 pounds in a week. (Your doctor may suggest a different range of weight gain.)     You have increased swelling in your legs, ankles, or feet. You are suddenly so tired or weak that you cannot do your usual activities. Watch closely for changes in your health, and be sure to contact your doctor if you develop new symptoms. Where can you learn more? Go to https://[employer].Guidekick. org and sign in to your [employer] account. Enter R378 in the [employer] box to learn more about \"Heart Failure: Care Instructions. \"     If you do not have an account, please click on the \"Sign Up Now\" link. Current as of: January 10, 2022               Content Version: 13.4  © 2006-2022 [employer]. Care instructions adapted under license by [facility] ([facility] If you have questions about a medical condition or this instruction, always ask your healthcare professional. [place] [address] any warranty or liability for your use of this information. Please visit www.cardiosmart. org for more information regarding cardiovascular disease prevention and treatment.

## 2022-09-30 ENCOUNTER — OFFICE VISIT (OUTPATIENT)
Dept: CARDIOLOGY CLINIC | Age: 67
End: 2022-09-30
Payer: MEDICARE

## 2022-09-30 VITALS
BODY MASS INDEX: 20.93 KG/M2 | DIASTOLIC BLOOD PRESSURE: 88 MMHG | HEIGHT: 67 IN | HEART RATE: 64 BPM | SYSTOLIC BLOOD PRESSURE: 138 MMHG | WEIGHT: 133.38 LBS

## 2022-09-30 DIAGNOSIS — I50.22 CHRONIC SYSTOLIC CHF (CONGESTIVE HEART FAILURE) (HCC): Primary | ICD-10-CM

## 2022-09-30 DIAGNOSIS — F10.10 ALCOHOL ABUSE: ICD-10-CM

## 2022-09-30 DIAGNOSIS — I42.0 DILATED CARDIOMYOPATHY (HCC): ICD-10-CM

## 2022-09-30 PROCEDURE — G8399 PT W/DXA RESULTS DOCUMENT: HCPCS | Performed by: INTERNAL MEDICINE

## 2022-09-30 PROCEDURE — 1036F TOBACCO NON-USER: CPT | Performed by: INTERNAL MEDICINE

## 2022-09-30 PROCEDURE — 1090F PRES/ABSN URINE INCON ASSESS: CPT | Performed by: INTERNAL MEDICINE

## 2022-09-30 PROCEDURE — G8427 DOCREV CUR MEDS BY ELIG CLIN: HCPCS | Performed by: INTERNAL MEDICINE

## 2022-09-30 PROCEDURE — 3017F COLORECTAL CA SCREEN DOC REV: CPT | Performed by: INTERNAL MEDICINE

## 2022-09-30 PROCEDURE — 1123F ACP DISCUSS/DSCN MKR DOCD: CPT | Performed by: INTERNAL MEDICINE

## 2022-09-30 PROCEDURE — 99214 OFFICE O/P EST MOD 30 MIN: CPT | Performed by: INTERNAL MEDICINE

## 2022-09-30 PROCEDURE — G8420 CALC BMI NORM PARAMETERS: HCPCS | Performed by: INTERNAL MEDICINE

## 2022-09-30 ASSESSMENT — ENCOUNTER SYMPTOMS
SLEEP DISTURBANCES DUE TO BREATHING: 0
SHORTNESS OF BREATH: 1

## 2022-10-11 ENCOUNTER — NURSE ONLY (OUTPATIENT)
Dept: CARDIOLOGY CLINIC | Age: 67
End: 2022-10-11

## 2022-10-11 VITALS — HEART RATE: 68 BPM | DIASTOLIC BLOOD PRESSURE: 80 MMHG | SYSTOLIC BLOOD PRESSURE: 110 MMHG

## 2022-10-11 DIAGNOSIS — I42.0 DILATED CARDIOMYOPATHY (HCC): ICD-10-CM

## 2022-10-11 DIAGNOSIS — I50.22 CHRONIC SYSTOLIC CHF (CONGESTIVE HEART FAILURE) (HCC): ICD-10-CM

## 2022-10-11 DIAGNOSIS — I25.10 CORONARY ARTERY DISEASE INVOLVING NATIVE CORONARY ARTERY OF NATIVE HEART WITHOUT ANGINA PECTORIS: Primary | ICD-10-CM

## 2022-10-11 LAB
ANION GAP SERPL CALC-SCNC: 5 MMOL/L (ref 2–11)
BUN SERPL-MCNC: 23 MG/DL (ref 8–23)
CALCIUM SERPL-MCNC: 9.6 MG/DL (ref 8.3–10.4)
CHLORIDE SERPL-SCNC: 105 MMOL/L (ref 101–110)
CO2 SERPL-SCNC: 27 MMOL/L (ref 21–32)
CREAT SERPL-MCNC: 0.9 MG/DL (ref 0.6–1)
GLUCOSE SERPL-MCNC: 71 MG/DL (ref 65–100)
MAGNESIUM SERPL-MCNC: 2.2 MG/DL (ref 1.8–2.4)
POTASSIUM SERPL-SCNC: 4.6 MMOL/L (ref 3.5–5.1)
SODIUM SERPL-SCNC: 137 MMOL/L (ref 133–143)

## 2022-10-11 RX ORDER — METOPROLOL SUCCINATE 25 MG/1
25 TABLET, EXTENDED RELEASE ORAL 2 TIMES DAILY
Qty: 60 TABLET | Refills: 3 | Status: SHIPPED | OUTPATIENT
Start: 2022-10-11

## 2022-10-11 NOTE — PROGRESS NOTES
Nurse visit,pt is taking entresto 24-26 mg every 12 hours. All other meds reviewed. Pt 110/80 HR 68 and regular. Per Dr Cabrales Husbands pt is to up Metoprolol succinate 25 mg to BID. Pt is ok to walk on treadmill to try to raise hr if she would like to. Pt given samples of entresto. Both meds sent to pharmacy.   Drug Samples Documentation:  Drug:Entresto  Strength: 24-26 mg  Quantity:112  Expiration Date:10/2024  Lot [id number]

## 2022-11-15 NOTE — PROGRESS NOTES
[facility]  Consultation Note    Patient ID:  Name: Ruth Ann Novoa  MRN: [id number]  AGE: 74 y.o.  : 1955    Date of Consultation:  November 15, 2022    CC:   Chief Complaint   Patient presents with    Back Pain         HPI:  Ms. Felicia Smith is a 58-year-old female who presents today for follow-up of low back pain. Gets relief with intermittent bilateral L4-5 transforaminal epidural steroid injection, most recent performed May 25, 2021. She has pain in the central and bilateral low back. It radiates to the bilateral hips. It does not radiate to the legs or feet. There are no lower extremity paresthesias or cramping. The symptoms have been worse over the last 3 to 4 months without specific trauma. It feels like the same pain she has had in the past.  The pain is aggravated with being on her feet or leaning forward. The pain awakens her at night. Tylenol helps some. The symptoms are alleviated when she sits with something behind her lower back. MRI lumbar spine  showed multilevel degenerative change with scoliosis and multilevel neuroforaminal narrowing. She had x-rays of the lumbar spine 2020, which showed scoliosis with multilevel degenerative change.      Past Medical History Includes:   Past Medical History:   Diagnosis Date    Alcohol abuse     Arthritis     Asthma     nebulizer twice daily, main inhaler and rescue inhaler- states breathing has improved significantly since med change    Former smoker     1 ppd x 8 years- quit     GERD (gastroesophageal reflux disease)     severe uncontrolle GERD esophogeal spasms/ denies hiatal hernia-- protonix twice daily, bentyl and sucralfate prn- 2-3 pillows-    Hiatal hernia     History of stomach ulcers     Hypertension     Hypothyroidism     pt denies hx    Seizures ([address]      \"caused by Compazine\"   ,   Past Surgical History:   Procedure Laterality Date    BREAST ENHANCEMENT SURGERY Bilateral over 20 yrs ago BREAST SURGERY      CARDIAC PROCEDURE N/A 2022    Left heart cath / coronary angiography performed by Rigoberto Akers MD at [address]    jaw / steel plate and screws    IMPLANT BREAST SILICONE/EQ      PARTIAL HYSTERECTOMY (CERVIX NOT REMOVED)      ovaries remain    [facility] [address] [facility]     TOTAL HIP ARTHROPLASTY Right 2007     Family History:   Family History   Problem Relation Age of Onset    Heart Disease Mother     Arrhythmia Mother     Heart Attack Mother     Ulcerative Colitis Mother     Brain Cancer Mother     Psoriasis Father     Cancer Sister         brain cancer (sister and brother)     Breast Cancer Sister 43    Cancer Brother         brain    No Known Problems Daughter     Arthritis Daughter     Other Daughter         plantar fasciitis    Asthma Daughter     Rheum Arthritis Maternal Aunt     Rheum Arthritis Maternal Aunt     Ovarian Cancer Neg Hx     Uterine Cancer Neg Hx     Colon Cancer Neg Hx       Social History:   Social History     Tobacco Use    Smoking status: Former     Packs/day: 1.00     Years: 30.00     Pack years: 30.00     Types: Cigarettes     Start date:      Quit date: 2006     Years since quittin.8    Smokeless tobacco: Never   Substance Use Topics    Alcohol use: Yes     Comment: several bottles of vodka weekly.  Sep 2022 reduced to 2 bottles wine weekly by self report       ALLERGIES:   Allergies   Allergen Reactions    Prochlorperazine Other (See Comments)     Dystonic reaction        Patient Medications    Current Outpatient Medications   Medication Sig    sacubitril-valsartan (ENTRESTO) 24-26 MG per tablet Take 1 tablet by mouth 2 times daily    metoprolol succinate (TOPROL XL) 25 MG extended release tablet Take 1 tablet by mouth in the morning and at bedtime    linaCLOtide (LINZESS PO) Take by mouth every morning    predniSONE (DELTASONE) 5 MG tablet Take 5 mg by mouth in the morning. (Patient not taking: Reported on 10/11/2022)    potassium chloride (KLOR-CON M) 10 MEQ extended release tablet Take 1 tablet by mouth in the morning. albuterol sulfate  (90 Base) MCG/ACT inhaler Inhale 2 puffs into the lungs every 4 hours as needed    azelastine (ASTELIN) 0.1 % nasal spray 1 spray by Nasal route 2 times daily    cetirizine (ZYRTEC) 10 MG tablet Take 10 mg by mouth daily    montelukast (SINGULAIR) 10 MG tablet Take 10 mg by mouth daily    pantoprazole (PROTONIX) 40 MG tablet Take 40 mg by mouth daily     No current facility-administered medications for this visit. ROS/Meds/PSH/PMH/FH/SH: I personally reviewed the patients standard intake form. Physical Exam:      Lumbar: PE: General: Awake, alert, no distress. HEENT: Mucous membranes moist and pink. Les Pimple Psych: Appropriate and conversant. Derm: No rash Gait: Unassisted, non-ataxic MS: Straight leg raise negative bilaterally. No pain with hip internal or external rotation. No pain with resisted hip flexion bilaterally. No pain with lumbar flexion. She has pain with lumbar extension. No pain with lumbar facet load. She has tenderness to palpation over the lower lumbar spine and paraspinals. Strength is 5/5 and symmetric in the bilateral lower extremities. No foot drop. Neurological: Sensation to light touch is intact in the bilateral lower extremities. Reflexes are 1+ and symmetric in the bilateral lower extremities. VITALS: @IPVITALS(8:)@ , [id number]       No flowsheet data found. No results found for any visits on 11/16/22. Assessment and Plan:     ICD-10-CM    1.  Spinal stenosis, lumbar region with neurogenic claudication  M48.062 Injection Authorization - Spine          Orders Placed This Encounter   Procedures    Injection Authorization - Spine     Referral Priority:   Routine     Number of Visits Requested:   1        4   This is a chronic illness/condition with exacerbation and progression  Treatment at this time: Discussed Injection therapy at length today, including risks, complications and alternative treatment options. The patient wishes to proceed. The injection will be performed as bilateral L4-5 transforaminal epidural steroid injections. Studies ordered today: none. We discussed the possibility of a new lumbar spine MRI. She would like to wait on that for now.       Shelbie Perez MD  11/17/2022,  5:14 PM

## 2022-11-16 ENCOUNTER — OFFICE VISIT (OUTPATIENT)
Dept: ORTHOPEDIC SURGERY | Age: 67
End: 2022-11-16
Payer: MEDICARE

## 2022-11-16 VITALS — BODY MASS INDEX: 20.89 KG/M2 | HEIGHT: 67 IN

## 2022-11-16 DIAGNOSIS — M48.062 SPINAL STENOSIS, LUMBAR REGION WITH NEUROGENIC CLAUDICATION: Primary | ICD-10-CM

## 2022-11-16 PROCEDURE — 99214 OFFICE O/P EST MOD 30 MIN: CPT | Performed by: PHYSICAL MEDICINE & REHABILITATION

## 2022-11-16 PROCEDURE — 1123F ACP DISCUSS/DSCN MKR DOCD: CPT | Performed by: PHYSICAL MEDICINE & REHABILITATION

## 2022-11-16 PROCEDURE — G8484 FLU IMMUNIZE NO ADMIN: HCPCS | Performed by: PHYSICAL MEDICINE & REHABILITATION

## 2022-11-16 PROCEDURE — G8427 DOCREV CUR MEDS BY ELIG CLIN: HCPCS | Performed by: PHYSICAL MEDICINE & REHABILITATION

## 2022-11-16 PROCEDURE — G8420 CALC BMI NORM PARAMETERS: HCPCS | Performed by: PHYSICAL MEDICINE & REHABILITATION

## 2022-11-16 PROCEDURE — 3017F COLORECTAL CA SCREEN DOC REV: CPT | Performed by: PHYSICAL MEDICINE & REHABILITATION

## 2022-11-16 PROCEDURE — 1090F PRES/ABSN URINE INCON ASSESS: CPT | Performed by: PHYSICAL MEDICINE & REHABILITATION

## 2022-11-16 PROCEDURE — 1036F TOBACCO NON-USER: CPT | Performed by: PHYSICAL MEDICINE & REHABILITATION

## 2022-11-16 PROCEDURE — G8399 PT W/DXA RESULTS DOCUMENT: HCPCS | Performed by: PHYSICAL MEDICINE & REHABILITATION

## 2022-11-16 NOTE — LETTER
[place]  1955  ______________________________________________________________________    Radiographic Studies:    Cervical MRI/ Contrast        Thoracic MRI/ Contrast        Lumbar MRI/ Contrast    CT Myelogram __________________________________________________    NCS/EMG______________________________________________________    MRI of ________________________________________________________    Other__________________________________________________________      Injections:    _______________________________________________________________    Authorization to stop blood thinners________________________________      Medications:    Oral steroids___________________    Muscle Relaxers___________________    Pain medications_____________________    NSAIDS_____________________    Neuropathic pain medication_________________________________________      Physical Therapy:    Lumbar     Thoracic     Cervical       Other_______________________________      Follow up/ Referral:    Pain referral_______________________________________________________    Referral___________________________________________________________    Follow up_________________________________________________________    Handicapped Parking_______________________________________________    Other_____________________________________________________________

## 2022-11-17 ENCOUNTER — TELEPHONE (OUTPATIENT)
Dept: FAMILY MEDICINE CLINIC | Facility: CLINIC | Age: 67
End: 2022-11-17

## 2022-11-17 NOTE — TELEPHONE ENCOUNTER
Pt called into nurse triage regarding a fall. 10 stitches in her hand. And was on antibiotics but has finished them and stated that she is in pain and it is still infected. Could not make it in for an appointment today. Made an appointment for tomorrow to be evaluated.

## 2022-11-18 ENCOUNTER — TELEPHONE (OUTPATIENT)
Dept: ORTHOPEDIC SURGERY | Age: 67
End: 2022-11-18

## 2022-11-18 ENCOUNTER — TELEPHONE (OUTPATIENT)
Dept: FAMILY MEDICINE CLINIC | Facility: CLINIC | Age: 67
End: 2022-11-18

## 2022-11-18 ENCOUNTER — OFFICE VISIT (OUTPATIENT)
Dept: FAMILY MEDICINE CLINIC | Facility: CLINIC | Age: 67
End: 2022-11-18
Payer: MEDICARE

## 2022-11-18 VITALS
BODY MASS INDEX: 21.19 KG/M2 | DIASTOLIC BLOOD PRESSURE: 76 MMHG | HEIGHT: 67 IN | SYSTOLIC BLOOD PRESSURE: 120 MMHG | WEIGHT: 135 LBS | TEMPERATURE: 98.2 F | HEART RATE: 57 BPM

## 2022-11-18 DIAGNOSIS — Z87.828 H/O OPEN HAND WOUND: Primary | ICD-10-CM

## 2022-11-18 PROCEDURE — 99213 OFFICE O/P EST LOW 20 MIN: CPT | Performed by: NURSE PRACTITIONER

## 2022-11-18 PROCEDURE — G8484 FLU IMMUNIZE NO ADMIN: HCPCS | Performed by: NURSE PRACTITIONER

## 2022-11-18 PROCEDURE — G8427 DOCREV CUR MEDS BY ELIG CLIN: HCPCS | Performed by: NURSE PRACTITIONER

## 2022-11-18 PROCEDURE — G8399 PT W/DXA RESULTS DOCUMENT: HCPCS | Performed by: NURSE PRACTITIONER

## 2022-11-18 PROCEDURE — 3017F COLORECTAL CA SCREEN DOC REV: CPT | Performed by: NURSE PRACTITIONER

## 2022-11-18 PROCEDURE — 3078F DIAST BP <80 MM HG: CPT | Performed by: NURSE PRACTITIONER

## 2022-11-18 PROCEDURE — 1090F PRES/ABSN URINE INCON ASSESS: CPT | Performed by: NURSE PRACTITIONER

## 2022-11-18 PROCEDURE — 1036F TOBACCO NON-USER: CPT | Performed by: NURSE PRACTITIONER

## 2022-11-18 PROCEDURE — 1123F ACP DISCUSS/DSCN MKR DOCD: CPT | Performed by: NURSE PRACTITIONER

## 2022-11-18 PROCEDURE — 3074F SYST BP LT 130 MM HG: CPT | Performed by: NURSE PRACTITIONER

## 2022-11-18 PROCEDURE — G8420 CALC BMI NORM PARAMETERS: HCPCS | Performed by: NURSE PRACTITIONER

## 2022-11-18 RX ORDER — LINACLOTIDE 72 UG/1
72 CAPSULE, GELATIN COATED ORAL
COMMUNITY
Start: 2022-11-16

## 2022-11-18 RX ORDER — SULFAMETHOXAZOLE AND TRIMETHOPRIM 800; 160 MG/1; MG/1
1 TABLET ORAL 2 TIMES DAILY
Qty: 14 TABLET | Refills: 0 | Status: SHIPPED | OUTPATIENT
Start: 2022-11-18 | End: 2022-11-25

## 2022-11-18 ASSESSMENT — PATIENT HEALTH QUESTIONNAIRE - PHQ9
SUM OF ALL RESPONSES TO PHQ9 QUESTIONS 1 & 2: 0
SUM OF ALL RESPONSES TO PHQ QUESTIONS 1-9: 0
SUM OF ALL RESPONSES TO PHQ QUESTIONS 1-9: 0
2. FEELING DOWN, DEPRESSED OR HOPELESS: 0
SUM OF ALL RESPONSES TO PHQ QUESTIONS 1-9: 0
SUM OF ALL RESPONSES TO PHQ QUESTIONS 1-9: 0
1. LITTLE INTEREST OR PLEASURE IN DOING THINGS: 0

## 2022-11-18 NOTE — TELEPHONE ENCOUNTER
Pt came into the office this morning for her left hand. Called [place] [facility]. Its hard to get in on Friday due to the doctors being in surgery's on Fridays. Reception did send a message to the providers that are there at the office to see if they could fit her in today. She has not gotten a response back from them. They went ahead and put her down for Monday at 10:40. And Ramseydavid Mercedes wrote her an antibiotic to get filled until she can be seen on Monday. [address] has patient's number just in case they have a cancellation.

## 2022-11-18 NOTE — PROGRESS NOTES
Subjective:      Patient ID: Jeronimo Tenorio is a 74 y.o. female. She fell on the 7th and cut her hand . She went to the ER on Sunday night and got stiches. Was given a tetanus shot and antibiotic . She has completed the antibiotic is here to get stitches removed but concerned may be infected. Sore to touch but can move it. The fall was a result for alcohol use. Is struggling to stop drinking . States last drink was on that night. Hand Injury       Review of Systems    Objective:   Physical Exam  Musculoskeletal:         General: Swelling and deformity present. Comments: Poorly approximated wound on gatica surface of  left hand. Edges swollen and red. Wound gapes when sutures removed. Very tender to touch   8 sutures removed edges of wound lipped under and parted to reveal sq tissue    Assessment:      /76 (Site: Right Upper Arm, Position: Sitting, Cuff Size: Medium Adult)   Pulse 57   Temp 98.2 °F (36.8 °C) (Temporal)   Ht 5' 7\" (1.702 m)   Wt 135 lb (61.2 kg)   BMI 21.14 kg/m²        Plan:      1. H/O open hand wound  2. Infected abrasion of left hand, subsequent encounter     Urgent referral to hand ortho wound may need to be debrided. ?  Wound not draining wrapped in telfa and guaze  Urged to go to [place] [id number] for assistance in stopping alcohol use  LINDA Nevarez - NP

## 2022-11-18 NOTE — TELEPHONE ENCOUNTER
Violeta Gooden  [phone] has this patient in her office now with hand injury fell in [place] took stitches out and incision looks infected / swollen she wants someone to see patient today

## 2022-11-21 ENCOUNTER — OFFICE VISIT (OUTPATIENT)
Dept: ORTHOPEDIC SURGERY | Age: 67
End: 2022-11-21
Payer: MEDICARE

## 2022-11-21 DIAGNOSIS — S61.412A LACERATION OF LEFT HAND, FOREIGN BODY PRESENCE UNSPECIFIED, INITIAL ENCOUNTER: Primary | ICD-10-CM

## 2022-11-21 PROCEDURE — G8484 FLU IMMUNIZE NO ADMIN: HCPCS | Performed by: NURSE PRACTITIONER

## 2022-11-21 PROCEDURE — 1090F PRES/ABSN URINE INCON ASSESS: CPT | Performed by: NURSE PRACTITIONER

## 2022-11-21 PROCEDURE — G8427 DOCREV CUR MEDS BY ELIG CLIN: HCPCS | Performed by: NURSE PRACTITIONER

## 2022-11-21 PROCEDURE — 1123F ACP DISCUSS/DSCN MKR DOCD: CPT | Performed by: NURSE PRACTITIONER

## 2022-11-21 PROCEDURE — 3017F COLORECTAL CA SCREEN DOC REV: CPT | Performed by: NURSE PRACTITIONER

## 2022-11-21 PROCEDURE — G8420 CALC BMI NORM PARAMETERS: HCPCS | Performed by: NURSE PRACTITIONER

## 2022-11-21 PROCEDURE — 1036F TOBACCO NON-USER: CPT | Performed by: NURSE PRACTITIONER

## 2022-11-21 PROCEDURE — G8399 PT W/DXA RESULTS DOCUMENT: HCPCS | Performed by: NURSE PRACTITIONER

## 2022-11-21 PROCEDURE — 99203 OFFICE O/P NEW LOW 30 MIN: CPT | Performed by: NURSE PRACTITIONER

## 2022-11-21 NOTE — PROGRESS NOTES
Orthopaedic Hand Clinic Note    Name: Julio Tran  YOB: 1955  Gender: female  MRN: [id number]      CC: Patient referred for evaluation of right upper extremity pain    HPI: Julio Tran is a 74 y.o. female right hand dominant with a chief complaint of left hand pain. She reports she fell while in [place] on November 7, 2022. She said she hit the door hinge with her left hand to try to break her fall. She was seen at the emergency room in [place]. She was x-rayed. She was given a tetanus shot and a dose of an antibiotic IM. She was discharged on Keflex daily for 1 week. She saw her PCP on Friday, November 18, 2022. She said at that time she had been without antibiotics for 2 days. She said her sutures were removed. She says her wound immediately dehisced. Neosporin was applied and bandaged. She was prescribed Bactrim twice daily. She is referred to orthopedics for evaluation and treatment. ROS/Meds/PSH/PMH/FH/SH: I personally reviewed the patients standard intake form. Pertinents are discussed in the HPI    Physical Examination:  General: Awake and alert. HEENT: Normocephalic, atraumatic  CV/Pulm: Breathing even and unlabored  Skin: No obvious rashes noted. Lymphatic: No obvious evidence of lymphedema or lymphadenopathy    Musculoskeletal Exam:  Examination on the left upper extremity demonstrates cap refill < 5 seconds in all fingers,  Patient has a laceration to the palmar aspect of her left hand. The sutures have been removed. Her wound is dehisced. There is no active drainage. There is no erythema. Patient is almost able to make a composite fist.  She has full extension of all digits. She denies paresthesia. She has normal sensation. She has good capillary refill. Imaging / Electrodiagnostic Tests:     None    Assessment:   1.  Laceration of left hand, foreign body presence unspecified, initial encounter        Plan:   We discussed the diagnosis and different treatment options. We discussed observation, therapy, antiinflammatory medications and other pertinent treatment modalities. After discussing in detail the patient elects to proceed with Xeroform dressing changes. She is allowed to shower with antibacterial soap and water. She will complete her course of Bactrim. I will see her back in 2 weeks to reassess her progress. .     Patient voiced accordance and understanding of the information provided and the formulated plan. All questions were answered to the patient's satisfaction during the encounter.     Treatment at this time:  Antibiotics, dressing changes    LINDA Edwards CNP  Orthopaedic Surgery  11/21/22  1:29 PM

## 2022-11-30 ENCOUNTER — OFFICE VISIT (OUTPATIENT)
Dept: ORTHOPEDIC SURGERY | Age: 67
End: 2022-11-30

## 2022-11-30 DIAGNOSIS — M48.062 SPINAL STENOSIS, LUMBAR REGION WITH NEUROGENIC CLAUDICATION: Primary | ICD-10-CM

## 2022-11-30 RX ORDER — METHYLPREDNISOLONE ACETATE 80 MG/ML
80 INJECTION, SUSPENSION INTRA-ARTICULAR; INTRALESIONAL; INTRAMUSCULAR; SOFT TISSUE ONCE
Status: COMPLETED | OUTPATIENT
Start: 2022-11-30 | End: 2022-11-30

## 2022-11-30 RX ADMIN — METHYLPREDNISOLONE ACETATE 80 MG: 80 INJECTION, SUSPENSION INTRA-ARTICULAR; INTRALESIONAL; INTRAMUSCULAR; SOFT TISSUE at 15:38

## 2022-11-30 NOTE — PROGRESS NOTES
Name: Mir Aguilar  YOB: 1955  Gender: female  MRN: [id number]        Transforaminal CHANTE Procedure Note    Procedure: Bilateral  L4-L5 transforaminal epidural steroid injections     Precautions: Mir Aguilar denies prior sensitivity to steroid, local anesthetic, iodine, or shellfish. Consent:  Consent was obtained prior to the procedure. The procedure was discussed at length with Mir Aguilar. She was given the opportunity to ask questions regarding the procedure and its associated risks. In addition to the potential risks associated with the procedure itself, the patient was informed both verbally and in writing of potential side effects of the use glucocorticoids. The patient appeared to comprehend the informed consent and desired to have the procedure performed, and informed consent was signed. She was placed in a prone position on the fluoroscopy table and the skin was prepped and draped in a routine sterile fashion. The areas to be injected were each anesthetized with 1 ml of 1% Lidocaine. A 22 gauge 3.5 inch spinal needle was carefully advanced under fluoroscopic guidance to the right L4-L5 transforaminal space  0.5 ml of 70% of Omnipaque was injected to confirm proper needle placement and absence of subdural or vascular flow Once proper placement was confirmed, 0.5ml of 0.25 marcaine and 40 mg of depomedrol were injected through the spinal needle. The above procedure was then repeated at the Left  L4-L5 transforaminal space using 40 mg of depomedrol. Fluoroscopic guidance was used intermittently over a 10-minute period to insure proper needle placement and her safety. A hard copy of the fluoroscopic image has been placed in her chart and is saved on the C-arm hard drive. She was monitored for 30 minutes after the procedure and discharged home in a stable fashion with a routine follow up. Procedural Diagnosis:     ICD-10-CM    1.  Spinal stenosis, lumbar region with neurogenic claudication  M48.062 FL NERVE BLOCK LUMBOSACRAL 1ST     methylPREDNISolone acetate (DEPO-MEDROL) injection 80 mg         Baltazar Resendiz MD  11/30/22

## 2022-12-16 ENCOUNTER — TELEPHONE (OUTPATIENT)
Dept: CARDIOLOGY CLINIC | Age: 67
End: 2022-12-16

## 2022-12-16 ENCOUNTER — OFFICE VISIT (OUTPATIENT)
Dept: ORTHOPEDIC SURGERY | Age: 67
End: 2022-12-16
Payer: MEDICARE

## 2022-12-16 DIAGNOSIS — S61.412A LACERATION OF LEFT HAND, FOREIGN BODY PRESENCE UNSPECIFIED, INITIAL ENCOUNTER: Primary | ICD-10-CM

## 2022-12-16 PROCEDURE — G8484 FLU IMMUNIZE NO ADMIN: HCPCS | Performed by: NURSE PRACTITIONER

## 2022-12-16 PROCEDURE — G8399 PT W/DXA RESULTS DOCUMENT: HCPCS | Performed by: NURSE PRACTITIONER

## 2022-12-16 PROCEDURE — 1036F TOBACCO NON-USER: CPT | Performed by: NURSE PRACTITIONER

## 2022-12-16 PROCEDURE — G8420 CALC BMI NORM PARAMETERS: HCPCS | Performed by: NURSE PRACTITIONER

## 2022-12-16 PROCEDURE — G8427 DOCREV CUR MEDS BY ELIG CLIN: HCPCS | Performed by: NURSE PRACTITIONER

## 2022-12-16 PROCEDURE — 3017F COLORECTAL CA SCREEN DOC REV: CPT | Performed by: NURSE PRACTITIONER

## 2022-12-16 PROCEDURE — 1090F PRES/ABSN URINE INCON ASSESS: CPT | Performed by: NURSE PRACTITIONER

## 2022-12-16 PROCEDURE — 1123F ACP DISCUSS/DSCN MKR DOCD: CPT | Performed by: NURSE PRACTITIONER

## 2022-12-16 PROCEDURE — 99213 OFFICE O/P EST LOW 20 MIN: CPT | Performed by: NURSE PRACTITIONER

## 2022-12-16 NOTE — TELEPHONE ENCOUNTER
Spoke to pt and informed her samples of Entresto are ready for pickup at [facility] office.

## 2022-12-16 NOTE — PROGRESS NOTES
Orthopaedic Hand Clinic Note    Name: Ruth Ann Novoa  YOB: 1955  Gender: female  MRN: [id number]      Follow up visit:   1. Laceration of left hand, foreign body presence unspecified, initial encounter        HPI: Ruth Ann Novoa is a 74 y.o. female who is following up for left hand laceration. It has been about 5 weeks from injury. She reports some tenderness around the wound. ROS/Meds/PSH/PMH/FH/SH: I personally reviewed the patients standard intake form. Pertinents are discussed in the HPI    Physical Examination:    Musculoskeletal Examination:  Examination on the left upper extremity demonstrates cap refill < 5 seconds in all fingers,  Patient has mild swelling to the left hand. There is no erythema or drainage. She has 1 area with some scabbing. She denies paresthesia. She able to make a composite fist.  She is hypersensitive over the wound. She has good capillary refill. .    Imaging / Electrodiagnostic Tests:     None    Assessment:     ICD-10-CM    1. Laceration of left hand, foreign body presence unspecified, initial encounter  S61.412A           Plan:   We discussed the diagnosis and different treatment options. We discussed observation, therapy, antiinflammatory medications and other pertinent treatment modalities. After discussing in detail the patient elects to proceed with washing with warm soapy water. After her scabs fall off she can work on the scar with vitamin E. We will put her into therapy for scar massage and desensitization. We will see her back in 6 weeks if she is having any issues. .     Patient voiced accordance and understanding of the information provided and the formulated plan. All questions were answered to the patient's satisfaction during the encounter.     3 This is an acute uncomplicated problem/injury  Treatment at this time:  [address], APRN - CNP  Orthopaedic Surgery  12/16/22  12:30 PM

## 2022-12-20 ENCOUNTER — OFFICE VISIT (OUTPATIENT)
Dept: CARDIOLOGY CLINIC | Age: 67
End: 2022-12-20
Payer: MEDICARE

## 2022-12-20 VITALS
BODY MASS INDEX: 21.72 KG/M2 | HEIGHT: 67 IN | DIASTOLIC BLOOD PRESSURE: 60 MMHG | WEIGHT: 138.4 LBS | HEART RATE: 64 BPM | SYSTOLIC BLOOD PRESSURE: 118 MMHG

## 2022-12-20 DIAGNOSIS — I42.0 DILATED CARDIOMYOPATHY (HCC): ICD-10-CM

## 2022-12-20 DIAGNOSIS — I50.22 CHRONIC SYSTOLIC CHF (CONGESTIVE HEART FAILURE) (HCC): Primary | ICD-10-CM

## 2022-12-20 DIAGNOSIS — Z78.9 ALCOHOL USE: ICD-10-CM

## 2022-12-20 PROCEDURE — G8427 DOCREV CUR MEDS BY ELIG CLIN: HCPCS | Performed by: INTERNAL MEDICINE

## 2022-12-20 PROCEDURE — 1090F PRES/ABSN URINE INCON ASSESS: CPT | Performed by: INTERNAL MEDICINE

## 2022-12-20 PROCEDURE — 1036F TOBACCO NON-USER: CPT | Performed by: INTERNAL MEDICINE

## 2022-12-20 PROCEDURE — 99214 OFFICE O/P EST MOD 30 MIN: CPT | Performed by: INTERNAL MEDICINE

## 2022-12-20 PROCEDURE — G8399 PT W/DXA RESULTS DOCUMENT: HCPCS | Performed by: INTERNAL MEDICINE

## 2022-12-20 PROCEDURE — 3074F SYST BP LT 130 MM HG: CPT | Performed by: INTERNAL MEDICINE

## 2022-12-20 PROCEDURE — G8420 CALC BMI NORM PARAMETERS: HCPCS | Performed by: INTERNAL MEDICINE

## 2022-12-20 PROCEDURE — 1123F ACP DISCUSS/DSCN MKR DOCD: CPT | Performed by: INTERNAL MEDICINE

## 2022-12-20 PROCEDURE — 3017F COLORECTAL CA SCREEN DOC REV: CPT | Performed by: INTERNAL MEDICINE

## 2022-12-20 PROCEDURE — 3078F DIAST BP <80 MM HG: CPT | Performed by: INTERNAL MEDICINE

## 2022-12-20 PROCEDURE — G8484 FLU IMMUNIZE NO ADMIN: HCPCS | Performed by: INTERNAL MEDICINE

## 2022-12-20 ASSESSMENT — ENCOUNTER SYMPTOMS: SHORTNESS OF BREATH: 1

## 2022-12-20 NOTE — PROGRESS NOTES
[facility] CARDIOLOGY  [address] [facility], [address]  PHONE: [phone]      22    NAME:  Luisa Govea  : 1955  MRN: [id number]         SUBJECTIVE:   Luisa Govea is a 74 y.o. female seen for a follow up visit regarding the following:     Chief Complaint   Patient presents with    Palpitations            HPI:  Follow up  Palpitations   . Follow up NICM. On max tolerated Entresto, increased metoprolol last visit and due for limited echo to reassess EF. Also need to look at [facility] therapy. Also referred to counseling last visit for anxiety and heavy alcohol use. She's had cough and sinus congestion. But her cardiac status seems stable. Working on patient assistance with [employer] but has enough to continue taking. Her family have mentioned concerns about memory loss. She has a counseling appointment soon. To her eternal credit, she has not had a drink of alcohol since last visit!! She continues to walk regularly for exercise. 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 PAST CARDIAC HISTORY:  Alcohol abuse  2022      COVID 19 infection, outpatient care  Aug 2022     3 day monitor reviewed - NSR, frequent unifocal pvc (11.3%), rare brief AT, longest 2.9 seconds     NST - anterior artifact, EF 29%     Echo - EF 35-40%, dilated LV     LHC - normal coronaries, EF 25-30%, LVE, EDP 12          Key CAD CHF Meds            sacubitril-valsartan (ENTRESTO) 24-26 MG per tablet (Taking)    Sig - Route: Take 1 tablet by mouth 2 times daily - Oral    metoprolol succinate (TOPROL XL) 25 MG extended release tablet (Taking)    Sig - Route: Take 1 tablet by mouth in the morning and at bedtime - Oral          Key Antihyperglycemic Medications            empagliflozin (JARDIANCE) 10 MG tablet (Taking)    Sig - Route:  Take 1 tablet by mouth daily - Oral                Past Medical History, Past Surgical History, Family history, Social History, and Medications were all reviewed with the patient today and updated as necessary. Prior to Admission medications    Medication Sig Start Date End Date Taking?  Authorizing Provider   empagliflozin (JARDIANCE) 10 MG tablet Take 1 tablet by mouth daily 12/20/22  Yes Lana Ortiz MD   LINZESS 72 MCG CAPS capsule Take 72 mcg by mouth every morning (before breakfast) 11/16/22  Yes Kristopher Plaza MD   sacubitril-valsartan (ENTRESTO) 24-26 MG per tablet Take 1 tablet by mouth 2 times daily 10/11/22  Yes Lana Ortiz MD   metoprolol succinate (TOPROL XL) 25 MG extended release tablet Take 1 tablet by mouth in the morning and at bedtime 10/11/22  Yes Lana Ortiz MD   predniSONE (DELTASONE) 5 MG tablet Take 5 mg by mouth daily as needed 5/6/22  Yes Jonathan Caldwell MD   albuterol sulfate  (90 Base) MCG/ACT inhaler Inhale 2 puffs into the lungs every 4 hours as needed 1/20/22  Yes Ar Automatic Reconciliation   azelastine (ASTELIN) 0.1 % nasal spray 1 spray by Nasal route 2 times daily 5/21/19  Yes Ar Automatic Reconciliation   pantoprazole (PROTONIX) 40 MG tablet Take 40 mg by mouth daily 1/20/22  Yes LINDA Maldonado - NP     Allergies   Allergen Reactions    Prochlorperazine Other (See Comments)     Dystonic reaction     Past Medical History:   Diagnosis Date    Alcohol abuse     Arthritis     Asthma     nebulizer twice daily, main inhaler and rescue inhaler- states breathing has improved significantly since med change    Former smoker     1 ppd x 8 years- quit 2006    GERD (gastroesophageal reflux disease)     severe uncontrolle GERD esophogeal spasms/ denies hiatal hernia-- protonix twice daily, bentyl and sucralfate prn- 2-3 pillows-    Hiatal hernia     History of stomach ulcers 2008    Hypertension     Hypothyroidism     pt denies hx    Seizures ([address] 1995     \"caused by Compazine\"     Past Surgical History:   Procedure Laterality Date BREAST ENHANCEMENT SURGERY Bilateral over 20 yrs ago    BREAST SURGERY      CARDIAC PROCEDURE N/A 2022    Left heart cath / coronary angiography performed by Shaye Hoskins MD at [address]    jaw / steel plate and screws    IMPLANT BREAST SILICONE/EQ      PARTIAL HYSTERECTOMY (CERVIX NOT REMOVED)      ovaries remain    [address] Left     TOTAL HIP ARTHROPLASTY Right 2007     Family History   Problem Relation Age of Onset    Heart Disease Mother     Arrhythmia Mother     Heart Attack Mother     Ulcerative Colitis Mother     Brain Cancer Mother     Psoriasis Father     Cancer Sister         brain cancer (sister and brother)     Breast Cancer Sister 43    Cancer Brother         brain    No Known Problems Daughter     Arthritis Daughter     Other Daughter         plantar fasciitis    Asthma Daughter     Rheum Arthritis Maternal Aunt     Rheum Arthritis Maternal Aunt     Ovarian Cancer Neg Hx     Uterine Cancer Neg Hx     Colon Cancer Neg Hx      Social History     Tobacco Use    Smoking status: Former     Packs/day: 1.00     Years: 30.00     Pack years: 30.00     Types: Cigarettes     Start date:      Quit date: 2006     Years since quittin.9    Smokeless tobacco: Never   Substance Use Topics    Alcohol use: Yes     Comment: several bottles of vodka weekly. Sep 2022 reduced to 2 bottles wine weekly by self report       ROS:    Review of Systems   Constitutional: Positive for malaise/fatigue. Respiratory:  Positive for shortness of breath. Psychiatric/Behavioral:  Positive for memory loss.          PHYSICAL EXAM:   /60   Pulse 64   Ht 5' 7\" (1.702 m)   Wt 138 lb 6.4 oz (62.8 kg)   BMI 21.68 kg/m²      Wt Readings from Last 3 Encounters:   22 138 lb 6.4 oz (62.8 kg)   22 135 lb (61.2 kg)   22 133 lb 6 oz (60.5 kg)     BP Readings from Last 3 Encounters:   12/20/22 118/60   11/18/22 120/76   10/11/22 110/80     Pulse Readings from Last 3 Encounters:   12/20/22 64   11/18/22 57   10/11/22 68           Physical Exam  Constitutional:       Appearance: Normal appearance. HENT:      Head: Normocephalic and atraumatic. Eyes:      Conjunctiva/sclera: Conjunctivae normal.   Neck:      Vascular: No carotid bruit. Cardiovascular:      Rate and Rhythm: Normal rate and regular rhythm. Heart sounds: No murmur heard. No friction rub. No gallop. Pulmonary:      Effort: No respiratory distress. Breath sounds: No wheezing or rales. Musculoskeletal:         General: No swelling. Cervical back: Neck supple. Skin:     General: Skin is warm and dry. Neurological:      General: No focal deficit present. Mental Status: She is alert. Psychiatric:         Mood and Affect: Mood normal.         Behavior: Behavior normal.       Medical problems and test results were reviewed with the patient today.      DATA REVIEW    LIPID PANEL     Lab Results   Component Value Date    CHOL 214 (H) 07/20/2022    CHOL 155 02/15/2021    CHOL 201 (H) 09/04/2019     Lab Results   Component Value Date    TRIG 57 07/20/2022    TRIG 59 02/15/2021    TRIG 69 09/04/2019     Lab Results   Component Value Date    HDL 88 (H) 07/20/2022    HDL 72 02/15/2021     09/04/2019     Lab Results   Component Value Date    LDLCALC 114.6 (H) 07/20/2022    LDLCALC 71 02/15/2021    LDLCALC 84 09/04/2019     Lab Results   Component Value Date    LABVLDL 11.4 07/20/2022    LABVLDL 14 09/04/2019    VLDL 12 02/15/2021     Lab Results   Component Value Date    CHOLHDLRATIO 2.4 07/20/2022       BMP  Lab Results   Component Value Date/Time     10/11/2022 10:00 AM    K 4.6 10/11/2022 10:00 AM     10/11/2022 10:00 AM    CO2 27 10/11/2022 10:00 AM    BUN 23 10/11/2022 10:00 AM    CREATININE 0.90 10/11/2022 10:00 AM    GLUCOSE 71 10/11/2022 10:00 AM    CALCIUM 9.6 10/11/2022 10:00 AM          EKG        CXR/IMAGING        DEVICE INTERROGATION        OUTSIDE RECORDS REVIEW    Records from outside providers have been reviewed and summarized as noted in the HPI, past history and data review sections of this note, and reviewed with patient. .       ASSESSMENT and PLAN    Dahiana Marx was seen today for palpitations. Diagnoses and all orders for this visit:    Chronic systolic CHF (congestive heart failure) (HCC)  -     Transthoracic echocardiogram (TTE) limited with contrast, bubble, strain, and 3D PRN; Future    Dilated cardiomyopathy ([address]  -     Basic Metabolic Panel; Future    Alcohol use    Other orders  -     empagliflozin (JARDIANCE) 10 MG tablet; Take 1 tablet by mouth daily        IMPRESSION:    No alcohol since last visit, pursuing counseling and considering AA. Very well done. On max tolerated BB and ARNI. Repeat limited echo and begin SGLT2i therapy. She will need patient assistance undoubtedly, is given 2 week supply and 14 day free sample card. Reviewed benefits and risks including rare perineal infection. Repeat BMP in 2 weeks. NYHA II continue exercise. Multiple reasons for mild memory loss including her underlying cardiovascular disease, prior alcohol use, stress, etc. She will monitor and keep her PCP informed as well. Return for AFTER PROCEDURE TO REVIEW RESULTS. Thank you for allowing me to participate in this patient's care. Please call or contact me if there are any questions or concerns regarding the above.       Christopher Srinivasan MD  12/20/22  10:02 AM

## 2022-12-20 NOTE — PATIENT INSTRUCTIONS
Patient Education        Heart Failure: Care Instructions  Overview     Heart failure occurs when your heart does not pump as much blood as the body needs. Failure does not mean that the heart has stopped pumping but rather that it is not pumping as well as it should. Over time, this causes fluid buildup in your lungs and other parts of your body. Fluid buildup can cause shortness of breath, fatigue, swollen ankles, and other problems. Heart failure is treated with medicines, a heart-healthy lifestyle, and the steps you take to check your symptoms. Treatment can slow the disease, help you feel better, and help keep you out of the hospital. Treatment may also help you live longer. Follow-up care is a key part of your treatment and safety. Be sure to make and go to all appointments, and call your doctor if you are having problems. It's also a good idea to know your test results and keep a list of the medicines you take. How can you care for yourself at home? Medicines    Be safe with medicines. Take your medicines exactly as prescribed. Call your doctor if you think you are having a problem with your medicine. You will get more details on the specific medicines your doctor prescribes. Medicines can help your heart work better, help you feel better, and help keep you out of the hospital. Medicines may also help you live longer. Talk with your doctor or pharmacist before you take a new prescription or over-the-counter medicine. Ask if the medicine is safe for you to take. Some medicines can affect your heart and make heart failure worse. Others may keep your heart failure medicines from working right. Over-the-counter medicines that you may need to avoid include herbal supplements, vitamins, pain relievers called NSAIDs, antacids, laxatives, and cough, cold, flu, or sinus medicine. Diet    Eat heart-healthy foods. These foods include vegetables, fruits, nuts, beans, lean meat, fish, and whole grains. Your doctor may suggest that you limit sodium. Your doctor can tell you how much sodium is right for you. An example is less than 3,000 mg a day. This includes all the salt you eat in cooking or in packaged foods. People get most of their sodium from processed foods. Fast food and restaurant meals also tend to be very high in sodium. Limit your fluid intake if your doctor tells you to. Your doctor will tell you how much fluid you can have in a day. Symptoms    Weigh yourself without clothing at the same time each day. Record your weight. Call your doctor if you have a sudden weight gain, such as more than 2 to 3 pounds in a day or 5 pounds in a week. (Your doctor may suggest a different range of weight gain.) A sudden weight gain may mean that your heart failure is getting worse. Check your symptoms every day to watch for changes. Know what to do if your symptoms get worse. Activity    Be active. Do not start to exercise until you have talked with your doctor. Together you can make an exercise program that is enjoyable and safe for you. Regular exercise can make your heart and your body stronger. Being active can help you feel better too. With your doctor, plan how often, how long, and how hard you will be active. Don't exercise too hard because it can put stress on your heart. If your doctor has not set you up with a cardiac rehabilitation (rehab) program, ask if it's right for you. Cardiac rehab can give you education and support that help you stay as healthy as possible. When you exercise, watch for signs that your heart is working too hard. You are pushing yourself too hard if you cannot talk while you are exercising. If you become short of breath or dizzy or have chest pain, stop, sit down, and rest.   Heart-healthy lifestyle    Do not smoke. Smoking can make a heart condition worse. If you need help quitting, talk to your doctor about stop-smoking programs and medicines.  These can increase your chances of quitting for good. Quitting smoking may be the most important step you can take to protect your heart. Stay at a healthy weight. Lose weight if you need to. Manage other health problems such as diabetes and high blood pressure. Limit or avoid alcohol. Ask your doctor how much alcohol, if any, is safe for you. If you think you may have a problem with alcohol or drug use, talk to your doctor. Avoid infections such as COVID-19, colds, and the flu. Get the flu vaccine every year. Get a pneumococcal vaccine shot. If you have had one before, ask your doctor whether you need another dose. Stay up to date on your COVID-19 vaccines. When should you call for help? Call 911  if you have symptoms of sudden heart failure such as: You have severe trouble breathing. You cough up pink, foamy mucus. You have a new irregular or rapid heartbeat. Call your doctor now or seek immediate medical care if:    You have new or increased shortness of breath. You are dizzy or lightheaded, or you feel like you may faint. You have sudden weight gain, such as more than 2 to 3 pounds in a day or 5 pounds in a week. (Your doctor may suggest a different range of weight gain.)     You have increased swelling in your legs, ankles, or feet. You are suddenly so tired or weak that you cannot do your usual activities. Watch closely for changes in your health, and be sure to contact your doctor if you develop new symptoms. Where can you learn more? Go to http://www.woods.com/ and enter E597 to learn more about \"Heart Failure: Care Instructions. \"  Current as of: September 7, 2022               Content Version: 13.5  © 1382-3562 [employer]. Care instructions adapted under license by [facility] ([facility]).  If you have questions about a medical condition or this instruction, always ask your healthcare professional. Harsha Colon disclaims any warranty or liability for your use of this information. Please visit [email or web address] org for more information regarding cardiovascular disease prevention and treatment.

## 2023-01-18 ENCOUNTER — TELEMEDICINE (OUTPATIENT)
Dept: FAMILY MEDICINE CLINIC | Facility: CLINIC | Age: 68
End: 2023-01-18
Payer: MEDICARE

## 2023-01-18 VITALS — BODY MASS INDEX: 21.35 KG/M2 | WEIGHT: 136 LBS | HEART RATE: 54 BPM | TEMPERATURE: 98.1 F | HEIGHT: 67 IN

## 2023-01-18 DIAGNOSIS — J40 BRONCHITIS: Primary | ICD-10-CM

## 2023-01-18 DIAGNOSIS — M06.00 RHEUMATOID ARTHRITIS WITH NEGATIVE RHEUMATOID FACTOR, INVOLVING UNSPECIFIED SITE (HCC): ICD-10-CM

## 2023-01-18 DIAGNOSIS — I42.0 DILATED CARDIOMYOPATHY (HCC): ICD-10-CM

## 2023-01-18 PROCEDURE — 3017F COLORECTAL CA SCREEN DOC REV: CPT | Performed by: NURSE PRACTITIONER

## 2023-01-18 PROCEDURE — 1090F PRES/ABSN URINE INCON ASSESS: CPT | Performed by: NURSE PRACTITIONER

## 2023-01-18 PROCEDURE — G8484 FLU IMMUNIZE NO ADMIN: HCPCS | Performed by: NURSE PRACTITIONER

## 2023-01-18 PROCEDURE — G8420 CALC BMI NORM PARAMETERS: HCPCS | Performed by: NURSE PRACTITIONER

## 2023-01-18 PROCEDURE — G8427 DOCREV CUR MEDS BY ELIG CLIN: HCPCS | Performed by: NURSE PRACTITIONER

## 2023-01-18 PROCEDURE — 99213 OFFICE O/P EST LOW 20 MIN: CPT | Performed by: NURSE PRACTITIONER

## 2023-01-18 PROCEDURE — 1036F TOBACCO NON-USER: CPT | Performed by: NURSE PRACTITIONER

## 2023-01-18 PROCEDURE — 1123F ACP DISCUSS/DSCN MKR DOCD: CPT | Performed by: NURSE PRACTITIONER

## 2023-01-18 PROCEDURE — G8399 PT W/DXA RESULTS DOCUMENT: HCPCS | Performed by: NURSE PRACTITIONER

## 2023-01-18 RX ORDER — BENZONATATE 100 MG/1
100-200 CAPSULE ORAL 3 TIMES DAILY PRN
Qty: 60 CAPSULE | Refills: 0 | Status: SHIPPED | OUTPATIENT
Start: 2023-01-18 | End: 2023-01-25

## 2023-01-18 RX ORDER — DEXTROAMPHETAMINE SACCHARATE, AMPHETAMINE ASPARTATE MONOHYDRATE, DEXTROAMPHETAMINE SULFATE AND AMPHETAMINE SULFATE 7.5; 7.5; 7.5; 7.5 MG/1; MG/1; MG/1; MG/1
CAPSULE, EXTENDED RELEASE ORAL
COMMUNITY
Start: 2022-12-30 | End: 2023-01-18 | Stop reason: CLARIF

## 2023-01-18 RX ORDER — DEXTROAMPHETAMINE SACCHARATE, AMPHETAMINE ASPARTATE, DEXTROAMPHETAMINE SULFATE AND AMPHETAMINE SULFATE 5; 5; 5; 5 MG/1; MG/1; MG/1; MG/1
20 TABLET ORAL DAILY
COMMUNITY
Start: 2022-12-23 | End: 2023-01-18 | Stop reason: CLARIF

## 2023-01-18 RX ORDER — DOXYCYCLINE HYCLATE 100 MG
100 TABLET ORAL 2 TIMES DAILY
Qty: 20 TABLET | Refills: 0 | Status: SHIPPED | OUTPATIENT
Start: 2023-01-18 | End: 2023-01-28

## 2023-01-18 ASSESSMENT — PATIENT HEALTH QUESTIONNAIRE - PHQ9
1. LITTLE INTEREST OR PLEASURE IN DOING THINGS: 0
SUM OF ALL RESPONSES TO PHQ9 QUESTIONS 1 & 2: 0
SUM OF ALL RESPONSES TO PHQ QUESTIONS 1-9: 0
2. FEELING DOWN, DEPRESSED OR HOPELESS: 0
SUM OF ALL RESPONSES TO PHQ QUESTIONS 1-9: 0

## 2023-01-18 NOTE — PROGRESS NOTES
Melita Hansen (:  1955) is a Established patient, here for evaluation of the following:  illness  Assessment & Plan   Below is the assessment and plan developed based on review of pertinent history, physical exam, labs, studies, and medications. 1. Bronchitis  -     benzonatate (TESSALON) 100 MG capsule; Take 1-2 capsules by mouth 3 times daily as needed for Cough, Disp-60 capsule, R-0Normal  -     doxycycline hyclate (VIBRA-TABS) 100 MG tablet; Take 1 tablet by mouth 2 times daily for 10 days, Disp-20 tablet, R-0Normal  2. Rheumatoid arthritis with negative rheumatoid factor, involving unspecified site ([facility]  3. Dilated cardiomyopathy ([facility]  No follow-ups on file. Will treat for bronchitis failure to improve worsening s.s be seen    Subjective   HPI states has been coughing for 2 months has been drinking Nyquil at night to sleep Goes to sleep with a cough drop in her mouth. Is having no swelling in feet or legs weight is up because has stopped the adderall. Was told heart function improved. At last cardiology visit. Sees rheumatologist at intervals is on prednisone daily for her RA. Review of Systems       Objective   Patient-Reported Vitals  Patient-Reported Pulse: 54  Patient-Reported Temperature: 98.1       Physical Exam     Looks good cough at intervals        Melita Hansen, was evaluated through a synchronous (real-time) audio-video encounter. The patient (or guardian if applicable) is aware that this is a billable service, which includes applicable co-pays. This Virtual Visit was conducted with patient's (and/or legal guardian's) consent. The visit was conducted pursuant to the emergency declaration under the [address] [place] [address], [address] [place] [address] [facility] authority and the [employer] Act. Patient identification was verified, and a caregiver was present when appropriate.    The patient was located at Other: ed with her mother .   Provider was located at [facility] Dept): [address],  [address].         --LINDA Campo - NP

## 2023-01-26 RX ORDER — ALBUTEROL SULFATE 90 UG/1
2 AEROSOL, METERED RESPIRATORY (INHALATION) EVERY 4 HOURS PRN
Qty: 1 EACH | Refills: 2 | Status: SHIPPED | OUTPATIENT
Start: 2023-01-26

## 2023-02-03 NOTE — PROGRESS NOTES
[facility] CARDIOLOGY  [address], [address]  PHONE: [phone]      23    NAME:  Sabrina Wilkins  : 1955  MRN: [id number]         SUBJECTIVE:   Sabrina Wilkins is a 69 y.o. female seen for a follow up visit regarding the following:     Chief Complaint   Patient presents with    Congestive Heart Failure    Cardiomyopathy     Had Echo            HPI:  Follow up  Congestive Heart Failure and Cardiomyopathy (Had Echo)   . Follow up NICM. On max tolerated med regimen, started SGLT2i therapy last visit. Recovering alcoholic . Her EF has climbed to near normal! She was not able to tolerate the Jardiance noting some urinary issues. But remains on entresto and metoprolol and remains alcohol free, well done. Going to the gym, feeling well. PAST CARDIAC HISTORY:  Alcohol abuse  2022      COVID 19 infection, outpatient care  Aug 2022     3 day monitor reviewed - NSR, frequent unifocal pvc (11.3%), rare brief AT, longest 2.9 seconds     NST - anterior artifact, EF 29%     Echo - EF 35-40%, dilated LV     LHC - normal coronaries, EF 25-30%, LVE, EDP 12  Dec 2022       EF 45-50%          Key CAD CHF Meds            sacubitril-valsartan (ENTRESTO) 24-26 MG per tablet (Taking)    Sig - Route: Take 1 tablet by mouth 2 times daily - Oral    metoprolol succinate (TOPROL XL) 25 MG extended release tablet (Taking)    Sig - Route: Take 1 tablet by mouth in the morning and at bedtime - Oral          Key Antihyperglycemic Medications       Patient is on no antihyperglycemic meds. Past Medical History, Past Surgical History, Family history, Social History, and Medications were all reviewed with the patient today and updated as necessary. Prior to Admission medications    Medication Sig Start Date End Date Taking?  Authorizing Provider   albuterol sulfate HFA (PROVENTIL;VENTOLIN;PROAIR) 108 (90 Base) MCG/ACT inhaler Inhale 2 puffs into the lungs every 4 hours as needed for Shortness of Breath 1/26/23  Yes LINDA Eli NP   hyoscyamine (LEVSIN/SL) 125 MCG sublingual tablet PLACE 1 TABLET BY SUBLINGUAL ROUTE EVERY 6 HOURS AS NEEDED FOR PAIN 11/15/22  Yes MD SHARI Wadsworth 72 MCG CAPS capsule Take 72 mcg by mouth every morning (before breakfast) 11/16/22  Yes Stu Nuñez MD   sacubitril-valsartan (ENTRESTO) 24-26 MG per tablet Take 1 tablet by mouth 2 times daily 10/11/22  Yes Lino Stein MD   metoprolol succinate (TOPROL XL) 25 MG extended release tablet Take 1 tablet by mouth in the morning and at bedtime 10/11/22  Yes Lino Stein MD   predniSONE (DELTASONE) 5 MG tablet Take 5 mg by mouth daily as needed 5/6/22  Yes Gabe Horne MD   azelastine (ASTELIN) 0.1 % nasal spray 1 spray by Nasal route 2 times daily 5/21/19  Yes Ar Automatic Reconciliation   pantoprazole (PROTONIX) 40 MG tablet Take 40 mg by mouth daily 1/20/22  Yes LINDA Eli NP     Allergies   Allergen Reactions    Jardiance [Empagliflozin]     Prochlorperazine Other (See Comments)     Dystonic reaction     Past Medical History:   Diagnosis Date    Alcohol abuse     Arthritis     Asthma     nebulizer twice daily, main inhaler and rescue inhaler- states breathing has improved significantly since med change    Former smoker     1 ppd x 8 years- quit 2006    GERD (gastroesophageal reflux disease)     severe uncontrolle GERD esophogeal spasms/ denies hiatal hernia-- protonix twice daily, bentyl and sucralfate prn- 2-3 pillows-    Hiatal hernia     History of stomach ulcers 2008    Hypertension     Hypothyroidism     pt denies hx    Seizures ([address] 1995     \"caused by Compazine\"     Past Surgical History:   Procedure Laterality Date    BREAST ENHANCEMENT SURGERY Bilateral over 20 yrs ago    [address] N/A 9/21/2022    Left heart cath / coronary angiography performed by Edinson Dasilva MD at [facility] CATH LAB CERVICAL DISCECTOMY  [address]    jaw / steel plate and screws    IMPLANT BREAST SILICONE/EQ      PARTIAL HYSTERECTOMY (CERVIX NOT REMOVED)      ovaries remain    RHINOPLASTY      ROTATOR CUFF REPAIR Left     TOTAL HIP ARTHROPLASTY Right 2007     Family History   Problem Relation Age of Onset    Heart Disease Mother     Arrhythmia Mother     Heart Attack Mother     Ulcerative Colitis Mother     Brain Cancer Mother     Psoriasis Father     Cancer Sister         brain cancer (sister and brother)     Breast Cancer Sister 43    Cancer Brother         brain    No Known Problems Daughter     Arthritis Daughter     Other Daughter         plantar fasciitis    Asthma Daughter     Rheum Arthritis Maternal Aunt     Rheum Arthritis Maternal Aunt     Ovarian Cancer Neg Hx     Uterine Cancer Neg Hx     Colon Cancer Neg Hx      Social History     Tobacco Use    Smoking status: Former     Packs/day: 1.00     Years: 30.00     Pack years: 30.00     Types: Cigarettes     Start date:      Quit date: 2006     Years since quittin.1    Smokeless tobacco: Never   Substance Use Topics    Alcohol use: Yes     Comment: several bottles of vodka weekly. Sep 2022 reduced to 2 bottles wine weekly by self report       ROS:    Review of Systems   Cardiovascular:  Negative for chest pain, leg swelling and near-syncope. Respiratory:  Negative for shortness of breath. PHYSICAL EXAM:   /78   Pulse 60   Ht 5' 7\" (1.702 m)   Wt 140 lb 9.6 oz (63.8 kg)   BMI 22.02 kg/m²      Wt Readings from Last 3 Encounters:   23 140 lb 9.6 oz (63.8 kg)   23 136 lb (61.7 kg)   22 138 lb 6.4 oz (62.8 kg)     BP Readings from Last 3 Encounters:   23 110/78   22 118/60   22 120/76     Pulse Readings from Last 3 Encounters:   23 60   23 54   22 64           Physical Exam  Constitutional:       Appearance: Normal appearance.    HENT: Head: Normocephalic and atraumatic. Eyes:      Conjunctiva/sclera: Conjunctivae normal.   Neck:      Vascular: No carotid bruit. Cardiovascular:      Rate and Rhythm: Normal rate and regular rhythm. Heart sounds: No murmur heard. No friction rub. No gallop. Pulmonary:      Effort: No respiratory distress. Breath sounds: No wheezing or rales. Musculoskeletal:         General: No swelling. Cervical back: Neck supple. Skin:     General: Skin is warm and dry. Neurological:      General: No focal deficit present. Mental Status: She is alert. Psychiatric:         Mood and Affect: Mood normal.         Behavior: Behavior normal.       Medical problems and test results were reviewed with the patient today.      DATA REVIEW    LIPID PANEL     Lab Results   Component Value Date    CHOL 214 (H) 07/20/2022    CHOL 155 02/15/2021    CHOL 201 (H) 09/04/2019     Lab Results   Component Value Date    TRIG 57 07/20/2022    TRIG 59 02/15/2021    TRIG 69 09/04/2019     Lab Results   Component Value Date    HDL 88 (H) 07/20/2022    HDL 72 02/15/2021     09/04/2019     Lab Results   Component Value Date    LDLCALC 114.6 (H) 07/20/2022    LDLCALC 71 02/15/2021    LDLCALC 84 09/04/2019     Lab Results   Component Value Date    LABVLDL 11.4 07/20/2022    LABVLDL 14 09/04/2019    VLDL 12 02/15/2021     Lab Results   Component Value Date    CHOLHDLRATIO 2.4 07/20/2022       BMP  Lab Results   Component Value Date/Time     10/11/2022 10:00 AM    K 4.6 10/11/2022 10:00 AM     10/11/2022 10:00 AM    CO2 27 10/11/2022 10:00 AM    BUN 23 10/11/2022 10:00 AM    CREATININE 0.90 10/11/2022 10:00 AM    GLUCOSE 71 10/11/2022 10:00 AM    CALCIUM 9.6 10/11/2022 10:00 AM          EKG        CXR/IMAGING        DEVICE INTERROGATION        OUTSIDE RECORDS REVIEW    Records from outside providers have been reviewed and summarized as noted in the HPI, past history and data review sections of this note, and reviewed with patient. .       ASSESSMENT and PLAN    Melanie Castillo was seen today for congestive heart failure and cardiomyopathy. Diagnoses and all orders for this visit:    Dilated cardiomyopathy ([address]    Palpitations        IMPRESSION:      Excellent work on her part to remain off alcohol and taking her meds. Didn't tolerate SGLT2i therapy. Continue course, repeat echo one year, clinical follow up 6 months. Palpitations improved, no significant arrhythmia documented          Return in about 6 months (around 8/6/2023). Thank you for allowing me to participate in this patient's care. Please call or contact me if there are any questions or concerns regarding the above.       Sarah Guajardo MD  02/06/23  1:19 PM

## 2023-02-06 ENCOUNTER — OFFICE VISIT (OUTPATIENT)
Dept: CARDIOLOGY CLINIC | Age: 68
End: 2023-02-06
Payer: MEDICARE

## 2023-02-06 VITALS
DIASTOLIC BLOOD PRESSURE: 78 MMHG | HEIGHT: 67 IN | HEART RATE: 60 BPM | WEIGHT: 140.6 LBS | SYSTOLIC BLOOD PRESSURE: 110 MMHG | BODY MASS INDEX: 22.07 KG/M2

## 2023-02-06 DIAGNOSIS — I42.0 DILATED CARDIOMYOPATHY (HCC): Primary | ICD-10-CM

## 2023-02-06 DIAGNOSIS — R00.2 PALPITATIONS: ICD-10-CM

## 2023-02-06 PROCEDURE — G8484 FLU IMMUNIZE NO ADMIN: HCPCS | Performed by: INTERNAL MEDICINE

## 2023-02-06 PROCEDURE — G8427 DOCREV CUR MEDS BY ELIG CLIN: HCPCS | Performed by: INTERNAL MEDICINE

## 2023-02-06 PROCEDURE — 3017F COLORECTAL CA SCREEN DOC REV: CPT | Performed by: INTERNAL MEDICINE

## 2023-02-06 PROCEDURE — 1036F TOBACCO NON-USER: CPT | Performed by: INTERNAL MEDICINE

## 2023-02-06 PROCEDURE — 99213 OFFICE O/P EST LOW 20 MIN: CPT | Performed by: INTERNAL MEDICINE

## 2023-02-06 PROCEDURE — G8420 CALC BMI NORM PARAMETERS: HCPCS | Performed by: INTERNAL MEDICINE

## 2023-02-06 PROCEDURE — G8399 PT W/DXA RESULTS DOCUMENT: HCPCS | Performed by: INTERNAL MEDICINE

## 2023-02-06 PROCEDURE — 1123F ACP DISCUSS/DSCN MKR DOCD: CPT | Performed by: INTERNAL MEDICINE

## 2023-02-06 PROCEDURE — 3074F SYST BP LT 130 MM HG: CPT | Performed by: INTERNAL MEDICINE

## 2023-02-06 PROCEDURE — 1090F PRES/ABSN URINE INCON ASSESS: CPT | Performed by: INTERNAL MEDICINE

## 2023-02-06 PROCEDURE — 3078F DIAST BP <80 MM HG: CPT | Performed by: INTERNAL MEDICINE

## 2023-02-06 ASSESSMENT — ENCOUNTER SYMPTOMS: SHORTNESS OF BREATH: 0

## 2023-02-09 ENCOUNTER — CLINICAL DOCUMENTATION (OUTPATIENT)
Dept: CARDIOLOGY CLINIC | Age: 68
End: 2023-02-09

## 2023-02-09 NOTE — PROGRESS NOTES
Completed physician's portion of [employer] patient assistance application mailed to the patient as requested.

## 2023-02-22 ENCOUNTER — TELEPHONE (OUTPATIENT)
Dept: BEHAVIORAL/MENTAL HEALTH CLINIC | Age: 68
End: 2023-02-22

## 2023-02-22 NOTE — TELEPHONE ENCOUNTER
Patient cancelled her initial appointment with me for today due to having to care for her mother, per staff here.   OBDULIA Rosa

## 2023-02-27 ENCOUNTER — TELEPHONE (OUTPATIENT)
Dept: FAMILY MEDICINE CLINIC | Facility: CLINIC | Age: 68
End: 2023-02-27

## 2023-02-27 RX ORDER — METOPROLOL SUCCINATE 25 MG/1
TABLET, EXTENDED RELEASE ORAL
Qty: 180 TABLET | Refills: 2 | Status: SHIPPED | OUTPATIENT
Start: 2023-02-27

## 2023-03-21 ENCOUNTER — TELEPHONE (OUTPATIENT)
Dept: CARDIOLOGY CLINIC | Age: 68
End: 2023-03-21

## 2023-03-21 ENCOUNTER — OFFICE VISIT (OUTPATIENT)
Dept: FAMILY MEDICINE CLINIC | Facility: CLINIC | Age: 68
End: 2023-03-21
Payer: MEDICARE

## 2023-03-21 VITALS
TEMPERATURE: 98 F | DIASTOLIC BLOOD PRESSURE: 73 MMHG | WEIGHT: 140 LBS | SYSTOLIC BLOOD PRESSURE: 122 MMHG | HEIGHT: 67 IN | HEART RATE: 63 BPM | BODY MASS INDEX: 21.97 KG/M2

## 2023-03-21 DIAGNOSIS — I42.0 DILATED CARDIOMYOPATHY (HCC): ICD-10-CM

## 2023-03-21 DIAGNOSIS — J44.1 COPD WITH ACUTE EXACERBATION (HCC): ICD-10-CM

## 2023-03-21 DIAGNOSIS — J44.1 COPD WITH ACUTE EXACERBATION (HCC): Primary | ICD-10-CM

## 2023-03-21 LAB
BASOPHILS # BLD: 0 K/UL (ref 0–0.2)
BASOPHILS NFR BLD: 0 % (ref 0–2)
DIFFERENTIAL METHOD BLD: NORMAL
EOSINOPHIL # BLD: 0.1 K/UL (ref 0–0.8)
EOSINOPHIL NFR BLD: 1 % (ref 0.5–7.8)
ERYTHROCYTE [DISTWIDTH] IN BLOOD BY AUTOMATED COUNT: 13.7 % (ref 11.9–14.6)
HCT VFR BLD AUTO: 45.3 % (ref 35.8–46.3)
HGB BLD-MCNC: 14.6 G/DL (ref 11.7–15.4)
IMM GRANULOCYTES # BLD AUTO: 0 K/UL (ref 0–0.5)
IMM GRANULOCYTES NFR BLD AUTO: 0 % (ref 0–5)
LYMPHOCYTES # BLD: 1.4 K/UL (ref 0.5–4.6)
LYMPHOCYTES NFR BLD: 17 % (ref 13–44)
MCH RBC QN AUTO: 32.7 PG (ref 26.1–32.9)
MCHC RBC AUTO-ENTMCNC: 32.2 G/DL (ref 31.4–35)
MCV RBC AUTO: 101.6 FL (ref 82–102)
MONOCYTES # BLD: 0.5 K/UL (ref 0.1–1.3)
MONOCYTES NFR BLD: 6 % (ref 4–12)
NEUTS SEG # BLD: 5.9 K/UL (ref 1.7–8.2)
NEUTS SEG NFR BLD: 76 % (ref 43–78)
NRBC # BLD: 0 K/UL (ref 0–0.2)
PLATELET # BLD AUTO: 322 K/UL (ref 150–450)
PMV BLD AUTO: 10 FL (ref 9.4–12.3)
RBC # BLD AUTO: 4.46 M/UL (ref 4.05–5.2)
WBC # BLD AUTO: 7.9 K/UL (ref 4.3–11.1)

## 2023-03-21 PROCEDURE — G8420 CALC BMI NORM PARAMETERS: HCPCS | Performed by: NURSE PRACTITIONER

## 2023-03-21 PROCEDURE — 1036F TOBACCO NON-USER: CPT | Performed by: NURSE PRACTITIONER

## 2023-03-21 PROCEDURE — 99214 OFFICE O/P EST MOD 30 MIN: CPT | Performed by: NURSE PRACTITIONER

## 2023-03-21 PROCEDURE — 3017F COLORECTAL CA SCREEN DOC REV: CPT | Performed by: NURSE PRACTITIONER

## 2023-03-21 PROCEDURE — 1090F PRES/ABSN URINE INCON ASSESS: CPT | Performed by: NURSE PRACTITIONER

## 2023-03-21 PROCEDURE — G8399 PT W/DXA RESULTS DOCUMENT: HCPCS | Performed by: NURSE PRACTITIONER

## 2023-03-21 PROCEDURE — 1123F ACP DISCUSS/DSCN MKR DOCD: CPT | Performed by: NURSE PRACTITIONER

## 2023-03-21 PROCEDURE — 3023F SPIROM DOC REV: CPT | Performed by: NURSE PRACTITIONER

## 2023-03-21 PROCEDURE — 3078F DIAST BP <80 MM HG: CPT | Performed by: NURSE PRACTITIONER

## 2023-03-21 PROCEDURE — G8484 FLU IMMUNIZE NO ADMIN: HCPCS | Performed by: NURSE PRACTITIONER

## 2023-03-21 PROCEDURE — 3074F SYST BP LT 130 MM HG: CPT | Performed by: NURSE PRACTITIONER

## 2023-03-21 PROCEDURE — G8427 DOCREV CUR MEDS BY ELIG CLIN: HCPCS | Performed by: NURSE PRACTITIONER

## 2023-03-21 RX ORDER — FLUTICASONE FUROATE, UMECLIDINIUM BROMIDE AND VILANTEROL TRIFENATATE 200; 62.5; 25 UG/1; UG/1; UG/1
1 POWDER RESPIRATORY (INHALATION) DAILY
Qty: 1 EACH | Refills: 5 | Status: SHIPPED | OUTPATIENT
Start: 2023-03-21

## 2023-03-21 RX ORDER — BENZONATATE 100 MG/1
100-200 CAPSULE ORAL 3 TIMES DAILY PRN
Qty: 60 CAPSULE | Refills: 0 | Status: SHIPPED | OUTPATIENT
Start: 2023-03-21 | End: 2023-03-28

## 2023-03-21 RX ORDER — DEXTROAMPHETAMINE SACCHARATE, AMPHETAMINE ASPARTATE MONOHYDRATE, DEXTROAMPHETAMINE SULFATE AND AMPHETAMINE SULFATE 7.5; 7.5; 7.5; 7.5 MG/1; MG/1; MG/1; MG/1
1 CAPSULE, EXTENDED RELEASE ORAL 2 TIMES DAILY
COMMUNITY
Start: 2023-02-28

## 2023-03-21 RX ORDER — NEBULIZER ACCESSORIES
1 KIT MISCELLANEOUS DAILY PRN
Qty: 1 KIT | Refills: 0 | Status: SHIPPED | OUTPATIENT
Start: 2023-03-21

## 2023-03-21 RX ORDER — DEXTROAMPHETAMINE SACCHARATE, AMPHETAMINE ASPARTATE, DEXTROAMPHETAMINE SULFATE AND AMPHETAMINE SULFATE 5; 5; 5; 5 MG/1; MG/1; MG/1; MG/1
TABLET ORAL
COMMUNITY
Start: 2023-02-22

## 2023-03-21 RX ORDER — ALBUTEROL SULFATE 2.5 MG/3ML
2.5 SOLUTION RESPIRATORY (INHALATION) 4 TIMES DAILY PRN
Qty: 120 EACH | Refills: 3 | Status: SHIPPED | OUTPATIENT
Start: 2023-03-21

## 2023-03-21 SDOH — ECONOMIC STABILITY: INCOME INSECURITY: HOW HARD IS IT FOR YOU TO PAY FOR THE VERY BASICS LIKE FOOD, HOUSING, MEDICAL CARE, AND HEATING?: NOT HARD AT ALL

## 2023-03-21 SDOH — ECONOMIC STABILITY: HOUSING INSECURITY
IN THE LAST 12 MONTHS, WAS THERE A TIME WHEN YOU DID NOT HAVE A STEADY PLACE TO SLEEP OR SLEPT IN A SHELTER (INCLUDING NOW)?: NO

## 2023-03-21 SDOH — ECONOMIC STABILITY: FOOD INSECURITY: WITHIN THE PAST 12 MONTHS, THE FOOD YOU BOUGHT JUST DIDN'T LAST AND YOU DIDN'T HAVE MONEY TO GET MORE.: NEVER TRUE

## 2023-03-21 SDOH — ECONOMIC STABILITY: FOOD INSECURITY: WITHIN THE PAST 12 MONTHS, YOU WORRIED THAT YOUR FOOD WOULD RUN OUT BEFORE YOU GOT MONEY TO BUY MORE.: NEVER TRUE

## 2023-03-21 ASSESSMENT — PATIENT HEALTH QUESTIONNAIRE - PHQ9
SUM OF ALL RESPONSES TO PHQ QUESTIONS 1-9: 0
SUM OF ALL RESPONSES TO PHQ9 QUESTIONS 1 & 2: 0
SUM OF ALL RESPONSES TO PHQ QUESTIONS 1-9: 0
2. FEELING DOWN, DEPRESSED OR HOPELESS: 0
1. LITTLE INTEREST OR PLEASURE IN DOING THINGS: 0

## 2023-03-21 ASSESSMENT — ENCOUNTER SYMPTOMS
VOMITING: 1
NAUSEA: 0
COUGH: 1
SHORTNESS OF BREATH: 1

## 2023-03-21 NOTE — PROGRESS NOTES
(Temporal)   Ht 5' 7\" (1.702 m)   Wt 140 lb (63.5 kg)   BMI 21.93 kg/m²        Plan:      1. COPD with acute exacerbation (HCC)  -     benzonatate (TESSALON) 100 MG capsule; Take 1-2 capsules by mouth 3 times daily as needed for Cough, Disp-60 capsule, R-0Normal  -     fluticasone-umeclidin-vilant (TRELEGY ELLIPTA) 200-62.5-25 MCG/ACT AEPB inhaler; Inhale 1 puff into the lungs daily, Disp-1 each, R-5Normal  -     XR CHEST PA LAT (2 VIEWS); Future  -     Comprehensive Metabolic Panel; Future  -     CBC with Auto Differential; Future  -     albuterol (PROVENTIL) (2.5 MG/3ML) 0.083% nebulizer solution; Take 3 mLs by nebulization 4 times daily as needed for Wheezing, Disp-120 each, R-3Normal  2. Dilated cardiomyopathy ([address]     Started on trelegy referral back to pulmonary. Chest xray. Use albuterol nebs every 4 hours prn cough wheeze. Checking labs.  Strongly urged to NOT take STIMULANTS as increases her risk [address], APRN - NP

## 2023-03-22 ENCOUNTER — TELEPHONE (OUTPATIENT)
Dept: FAMILY MEDICINE CLINIC | Facility: CLINIC | Age: 68
End: 2023-03-22

## 2023-03-22 LAB
ALBUMIN SERPL-MCNC: 4 G/DL (ref 3.2–4.6)
ALBUMIN/GLOB SERPL: 1.1 (ref 0.4–1.6)
ALP SERPL-CCNC: 69 U/L (ref 50–136)
ALT SERPL-CCNC: 21 U/L (ref 12–65)
ANION GAP SERPL CALC-SCNC: 4 MMOL/L (ref 2–11)
AST SERPL-CCNC: 23 U/L (ref 15–37)
BILIRUB SERPL-MCNC: 0.6 MG/DL (ref 0.2–1.1)
BUN SERPL-MCNC: 18 MG/DL (ref 8–23)
CALCIUM SERPL-MCNC: 9.8 MG/DL (ref 8.3–10.4)
CHLORIDE SERPL-SCNC: 102 MMOL/L (ref 101–110)
CO2 SERPL-SCNC: 28 MMOL/L (ref 21–32)
CREAT SERPL-MCNC: 0.9 MG/DL (ref 0.6–1)
GLOBULIN SER CALC-MCNC: 3.7 G/DL (ref 2.8–4.5)
GLUCOSE SERPL-MCNC: 115 MG/DL (ref 65–100)
POTASSIUM SERPL-SCNC: 4.8 MMOL/L (ref 3.5–5.1)
PROT SERPL-MCNC: 7.7 G/DL (ref 6.3–8.2)
SODIUM SERPL-SCNC: 134 MMOL/L (ref 133–143)

## 2023-03-22 NOTE — TELEPHONE ENCOUNTER
----- Message from LINDA Wallace NP sent at 3/22/2023  7:42 AM EDT -----  No infection by xray and labs do your nebs every 4 hours and if no pulmonary apt follow up in 1 week here

## 2023-04-20 ENCOUNTER — OFFICE VISIT (OUTPATIENT)
Dept: ORTHOPEDIC SURGERY | Age: 68
End: 2023-04-20

## 2023-04-20 DIAGNOSIS — M48.062 SPINAL STENOSIS, LUMBAR REGION WITH NEUROGENIC CLAUDICATION: Primary | ICD-10-CM

## 2023-04-20 RX ORDER — TRIAMCINOLONE ACETONIDE 40 MG/ML
40 INJECTION, SUSPENSION INTRA-ARTICULAR; INTRAMUSCULAR ONCE
Status: COMPLETED | OUTPATIENT
Start: 2023-04-20 | End: 2023-04-20

## 2023-04-20 RX ADMIN — TRIAMCINOLONE ACETONIDE 40 MG: 40 INJECTION, SUSPENSION INTRA-ARTICULAR; INTRAMUSCULAR at 14:54

## 2023-04-20 NOTE — PROGRESS NOTES
Name: Prince Culp  YOB: 1955  Gender: female  MRN: [id number]        Interlaminar CHANTE Procedure Note      Procedure: L5-S1 interlaminar epidural steroid injection    Precautions: Prince Culp denies prior sensitivity to steroid, local anesthetic, iodine, or shellfish. The pain is central now. We will repeat lumbar spine MRI, as the last scan was 2019. Consent:  Consent was obtained prior to the procedure. The procedure was discussed at length with Prince Culp. She was given the opportunity to ask questions regarding the procedure and its associated risks. In addition to the potential risks associated with the procedure itself, the patient was informed both verbally and in writing of potential side effects of the use glucocorticoids. The patient appeared to comprehend the informed consent and desired to have the procedure performed, and informed consent was signed. She was placed in a prone position on the fluoroscopy table and the skin was prepped and draped in a routine sterile fashion. The areas to be injected were each anesthetized with 1 ml of 1% Lidocaine. A 22 gauge 3.5 inch spinal needle was carefully advanced under fluoroscopic guidance to the L5-S1 interlaminar space. 0.5 ml of 70% of Omnipaque was injected to confirm proper needle placement and absence of subdural or vascular flow Once proper placement was confirmed, 2ml of sterile water and 40 mg of kenalog were injected through the spinal needle. Fluoroscopic guidance was used intermittently over a 10-minute period to insure proper needle placement and her safety. A hard copy of the fluoroscopic image has been placed in her chart and is saved on the C-arm hard drive. She was monitored for 30 minutes after the procedure and discharged home in a stable fashion with a routine follow up. Procedural Diagnosis:     ICD-10-CM    1.  Spinal stenosis, lumbar region with neurogenic claudication  M48.062

## 2023-05-01 ENCOUNTER — TELEPHONE (OUTPATIENT)
Dept: CARDIOLOGY CLINIC | Age: 68
End: 2023-05-01

## 2023-05-01 NOTE — TELEPHONE ENCOUNTER
Patient notified that samples are available for  at [facility] office, but will not be available for pick-up after two weeks.

## 2023-05-01 NOTE — TELEPHONE ENCOUNTER
Pt wants to know if she can get some samples of \"ENTRESTO\" pt states she is completely and has missed 1 day of dosage so far.

## 2023-05-31 ENCOUNTER — TELEPHONE (OUTPATIENT)
Age: 68
End: 2023-05-31

## 2023-05-31 NOTE — TELEPHONE ENCOUNTER
Patient called stating she would greatly appreciate samples of :    (ENTRESTO) 24-26 MG per tablet      Patient would like to pick these up at the [place] [address] office. Please call and advise.

## 2023-06-10 NOTE — PROGRESS NOTES
Madeleine Calendryanne  : 1955  [place]  Secondary: [address] [facility]  [address], [address]  [phone]   [phone]       OUTPATIENT PHYSICAL THERAPY:Daily Note 2020      TREATMENT:   PT Patient Time In/Time Out  Time In: 0815  Time Out: 6467      Total Time: 50min  Visit Count:  1   ICD-10: Treatment Diagnosis: M54.5, M48.062, M54.2      Subjective: See Evaluation note dated 2020 for details   Pain:     Objective: See Evaluation note dated 2020 for details      Therapeutic Exercise: (15min) Done in order to improve strength, ROM and understanding of current condition.     Date:   Date:   Date:   Date:     Activity/Exercise Parameters      Education Discussed examination findings, HEP, plan of care      TNE Pain alarms, triggers, breaking up tasks                                      Manual Therapy: (10min) Done in order to improve joint and soft tissue mobility,reduce muscle guarding, and decrease muscle tone   Date:   Date:   Date:   Date:     Type Parameters      Joint Mobilization CS: distraction grades II-III      Soft Tissue Mobilization B cervical paraspinals          Modalities: (-) Done in order to reduce swelling and pain    Assessment: See Evaluation note dated 2020 for details    Plan: See Evaluation note dated 2020 for details    Future Appointments   Date Time Provider Rober Andersoni   2020 11:40 AM Alfredo Smith NP [facility]   2020  4:00 PM Josi Chaudhari PT [facility]   2020 10:00 AM Josi Chaudhari PT [facility]   2020  9:00 AM Josi Chaudhari, PT [facility]       [place] Time:  25min freddie Vega PT, DPT, OCS
no

## 2023-06-27 ENCOUNTER — TELEPHONE (OUTPATIENT)
Dept: ORTHOPEDIC SURGERY | Age: 68
End: 2023-06-27

## 2023-06-27 DIAGNOSIS — M48.062 SPINAL STENOSIS, LUMBAR REGION WITH NEUROGENIC CLAUDICATION: Primary | ICD-10-CM

## 2023-07-10 ENCOUNTER — OFFICE VISIT (OUTPATIENT)
Dept: ORTHOPEDIC SURGERY | Age: 68
End: 2023-07-10
Payer: MEDICARE

## 2023-07-10 DIAGNOSIS — M48.062 SPINAL STENOSIS, LUMBAR REGION WITH NEUROGENIC CLAUDICATION: Primary | ICD-10-CM

## 2023-07-10 PROCEDURE — 62323 NJX INTERLAMINAR LMBR/SAC: CPT | Performed by: PHYSICAL MEDICINE & REHABILITATION

## 2023-07-10 RX ORDER — BETAMETHASONE SODIUM PHOSPHATE AND BETAMETHASONE ACETATE 3; 3 MG/ML; MG/ML
12 INJECTION, SUSPENSION INTRA-ARTICULAR; INTRALESIONAL; INTRAMUSCULAR; SOFT TISSUE ONCE
Status: COMPLETED | OUTPATIENT
Start: 2023-07-10 | End: 2023-07-10

## 2023-07-10 RX ADMIN — BETAMETHASONE SODIUM PHOSPHATE AND BETAMETHASONE ACETATE 12 MG: 3; 3 INJECTION, SUSPENSION INTRA-ARTICULAR; INTRALESIONAL; INTRAMUSCULAR; SOFT TISSUE at 09:53

## 2023-07-10 NOTE — PROGRESS NOTES
Name: Vinayak Torres  YOB: 1955  Gender: female  MRN: [id number]        Interlaminar CHANTE Procedure Note      Procedure: L5-S1 interlaminar epidural steroid injection    Precautions: Vinayak Torres denies prior sensitivity to steroid, local anesthetic, iodine, or shellfish. Consent:  Consent was obtained prior to the procedure. The procedure was discussed at length with Vinayak Torres. She was given the opportunity to ask questions regarding the procedure and its associated risks. In addition to the potential risks associated with the procedure itself, the patient was informed both verbally and in writing of potential side effects of the use glucocorticoids. The patient appeared to comprehend the informed consent and desired to have the procedure performed, and informed consent was signed. She was placed in a prone position on the fluoroscopy table and the skin was prepped and draped in a routine sterile fashion. The areas to be injected were each anesthetized with 1 ml of 1% Lidocaine. A 22 gauge 3.5 inch spinal needle was carefully advanced under fluoroscopic guidance to the L5-S1 interlaminar space. 0.5 ml of 70% of Omnipaque was injected to confirm proper needle placement and absence of subdural or vascular flow Once proper placement was confirmed, 2ml of sterile water and 12 mg of betamethasone were injected through the spinal needle. Fluoroscopic guidance was used intermittently over a 10-minute period to insure proper needle placement and her safety. A hard copy of the fluoroscopic image has been placed in her chart and is saved on the C-arm hard drive. She was monitored for 30 minutes after the procedure and discharged home in a stable fashion with a routine follow up. Procedural Diagnosis:     ICD-10-CM    1.  Spinal stenosis, lumbar region with neurogenic claudication  M48.062 XR INJ SPINE THER SUBST LUM/SAC W IMG     betamethasone acetate-betamethasone

## 2023-07-14 ENCOUNTER — TELEMEDICINE (OUTPATIENT)
Dept: ORTHOPEDIC SURGERY | Age: 68
End: 2023-07-14
Payer: MEDICARE

## 2023-07-14 DIAGNOSIS — M41.25 OTHER IDIOPATHIC SCOLIOSIS, THORACOLUMBAR REGION: ICD-10-CM

## 2023-07-14 DIAGNOSIS — M51.36 DISC DEGENERATION, LUMBAR: ICD-10-CM

## 2023-07-14 DIAGNOSIS — M47.816 LUMBAR SPONDYLOSIS: Primary | ICD-10-CM

## 2023-07-14 PROCEDURE — 99214 OFFICE O/P EST MOD 30 MIN: CPT | Performed by: PHYSICAL MEDICINE & REHABILITATION

## 2023-07-14 PROCEDURE — G8427 DOCREV CUR MEDS BY ELIG CLIN: HCPCS | Performed by: PHYSICAL MEDICINE & REHABILITATION

## 2023-07-14 PROCEDURE — G8399 PT W/DXA RESULTS DOCUMENT: HCPCS | Performed by: PHYSICAL MEDICINE & REHABILITATION

## 2023-07-14 PROCEDURE — 1123F ACP DISCUSS/DSCN MKR DOCD: CPT | Performed by: PHYSICAL MEDICINE & REHABILITATION

## 2023-07-14 PROCEDURE — 1036F TOBACCO NON-USER: CPT | Performed by: PHYSICAL MEDICINE & REHABILITATION

## 2023-07-14 PROCEDURE — G8420 CALC BMI NORM PARAMETERS: HCPCS | Performed by: PHYSICAL MEDICINE & REHABILITATION

## 2023-07-14 PROCEDURE — 3017F COLORECTAL CA SCREEN DOC REV: CPT | Performed by: PHYSICAL MEDICINE & REHABILITATION

## 2023-07-14 PROCEDURE — 1090F PRES/ABSN URINE INCON ASSESS: CPT | Performed by: PHYSICAL MEDICINE & REHABILITATION

## 2023-07-14 NOTE — PROGRESS NOTES
Take 1 capsule by mouth 2 times daily. (Patient not taking: Reported on 3/21/2023)    fluticasone-umeclidin-vilant (TRELEGY ELLIPTA) 200-62.5-25 MCG/ACT AEPB inhaler Inhale 1 puff into the lungs daily    albuterol (PROVENTIL) (2.5 MG/3ML) 0.083% nebulizer solution Take 3 mLs by nebulization 4 times daily as needed for Wheezing    Nebulizers (COMPRESSOR/NEBULIZER) MISC 1 each by Does not apply route 4 times daily as needed (wheezing)    Respiratory Therapy Supplies (NEBULIZER/TUBING/MOUTHPIECE) KIT 1 kit by Does not apply route daily as needed (q 4 hours prn cough wheeze)    metoprolol succinate (TOPROL XL) 25 MG extended release tablet TAKE 1 TABLET BY MOUTH IN THE MORNING AND IN THE EVENING    albuterol sulfate HFA (PROVENTIL;VENTOLIN;PROAIR) 108 (90 Base) MCG/ACT inhaler Inhale 2 puffs into the lungs every 4 hours as needed for Shortness of Breath    hyoscyamine (LEVSIN/SL) 125 MCG sublingual tablet PLACE 1 TABLET BY SUBLINGUAL ROUTE EVERY 6 HOURS AS NEEDED FOR PAIN    LINZESS 72 MCG CAPS capsule Take 72 mcg by mouth every morning (before breakfast) (Patient not taking: Reported on 3/21/2023)    predniSONE (DELTASONE) 5 MG tablet Take 5 mg by mouth daily as needed    azelastine (ASTELIN) 0.1 % nasal spray 1 spray by Nasal route 2 times daily    pantoprazole (PROTONIX) 40 MG tablet Take 40 mg by mouth daily     No current facility-administered medications for this visit. ROS/Meds/PSH/PMH/FH/SH: I personally reviewed the patients standard intake form. No flowsheet data found. No results found for any visits on 07/14/23. Assessment and Plan:     ICD-10-CM    1. Lumbar spondylosis  M47.816       2. Disc degeneration, lumbar  M51.36       3. Other idiopathic scoliosis, thoracolumbar region  M41.25           No orders of the defined types were placed in this encounter.        4   This is a chronic illness/condition with exacerbation and progression  Treatment at this time: She feels she

## 2023-08-17 NOTE — PROGRESS NOTES
[facility] CARDIOLOGY  [address] [facility]  [address] [place], [address] [place]  PHONE: [phone]      23    NAME:  Violeta Tanner  : 1955  MRN: [id number]         SUBJECTIVE:   Violeta Tanner is a 69 y.o. female seen for a follow up visit regarding the following:     Chief Complaint   Patient presents with    Cardiomyopathy    Congestive Heart Failure    Hypertension            HPI:  Follow up  Cardiomyopathy, Congestive Heart Failure, and Hypertension   . Follow up NICM, EF recovered to near normal, recovering alcoholic. Didn't tolerate SGLT2i therapy. Had a prolonged cough after covid 6 months ago. She's continued to exercise regularly. Still short of breath on stairs. She's had a few glasses of red wine on a few days, may not have any for several weeks. She understands to be wary of a slippery slope and agrees to call if she feels in need of help. PAST CARDIAC HISTORY:  Alcohol abuse  2022      COVID 19 infection, outpatient care  Aug 2022     3 day monitor reviewed - NSR, frequent unifocal pvc (11.3%), rare brief AT, longest 2.9 seconds     NST - anterior artifact, EF 29%     Echo - EF 35-40%, dilated LV     LHC - normal coronaries, EF 25-30%, LVE, EDP 12  Dec 2022       EF 45-50%             Key CAD CHF Meds            sacubitril-valsartan (ENTRESTO) 24-26 MG per tablet (Taking)    Sig - Route: Take 1 tablet by mouth 2 times daily - Oral    metoprolol succinate (TOPROL XL) 25 MG extended release tablet (Taking)    Sig: TAKE 1 TABLET BY MOUTH IN THE MORNING AND IN THE EVENING          Key Antihyperglycemic Medications       Patient is on no antihyperglycemic meds. Past Medical History, Past Surgical History, Family history, Social History, and Medications were all reviewed with the patient today and updated as necessary. Prior to Admission medications    Medication Sig Start Date End Date Taking?  Authorizing Provider   sacubitril-valsartan Caswell Pallas)

## 2023-08-18 ENCOUNTER — OFFICE VISIT (OUTPATIENT)
Age: 68
End: 2023-08-18
Payer: MEDICARE

## 2023-08-18 VITALS
DIASTOLIC BLOOD PRESSURE: 62 MMHG | HEART RATE: 70 BPM | HEIGHT: 67 IN | OXYGEN SATURATION: 98 % | BODY MASS INDEX: 21.94 KG/M2 | SYSTOLIC BLOOD PRESSURE: 110 MMHG | WEIGHT: 139.8 LBS

## 2023-08-18 DIAGNOSIS — I50.22 CHRONIC SYSTOLIC CHF (CONGESTIVE HEART FAILURE) (HCC): Primary | ICD-10-CM

## 2023-08-18 DIAGNOSIS — I42.0 DILATED CARDIOMYOPATHY (HCC): ICD-10-CM

## 2023-08-18 PROCEDURE — G8399 PT W/DXA RESULTS DOCUMENT: HCPCS | Performed by: INTERNAL MEDICINE

## 2023-08-18 PROCEDURE — 1036F TOBACCO NON-USER: CPT | Performed by: INTERNAL MEDICINE

## 2023-08-18 PROCEDURE — 3017F COLORECTAL CA SCREEN DOC REV: CPT | Performed by: INTERNAL MEDICINE

## 2023-08-18 PROCEDURE — 1123F ACP DISCUSS/DSCN MKR DOCD: CPT | Performed by: INTERNAL MEDICINE

## 2023-08-18 PROCEDURE — G8427 DOCREV CUR MEDS BY ELIG CLIN: HCPCS | Performed by: INTERNAL MEDICINE

## 2023-08-18 PROCEDURE — 99214 OFFICE O/P EST MOD 30 MIN: CPT | Performed by: INTERNAL MEDICINE

## 2023-08-18 PROCEDURE — 3078F DIAST BP <80 MM HG: CPT | Performed by: INTERNAL MEDICINE

## 2023-08-18 PROCEDURE — G8420 CALC BMI NORM PARAMETERS: HCPCS | Performed by: INTERNAL MEDICINE

## 2023-08-18 PROCEDURE — 3074F SYST BP LT 130 MM HG: CPT | Performed by: INTERNAL MEDICINE

## 2023-08-18 PROCEDURE — 1090F PRES/ABSN URINE INCON ASSESS: CPT | Performed by: INTERNAL MEDICINE

## 2023-08-18 ASSESSMENT — ENCOUNTER SYMPTOMS
COUGH: 1
SHORTNESS OF BREATH: 1

## 2023-10-17 NOTE — TELEPHONE ENCOUNTER
I called the patients cell phone and couldn't leave a message due to her voicemail being full. I sent a [employer] message about this and she will need to call either office to schedule an appointment. LINDA Bledsoe - NP  You 1 hour ago (12:10 PM)     EK  She will need to be seen her cough could be CHF.  Either here or at cardiology
Pt called and left a message about her cough isn't any better. She wanted to get a chest xray to check to see what is going on with her lungs.
done

## 2023-11-08 ENCOUNTER — HOSPITAL ENCOUNTER (OUTPATIENT)
Dept: MAMMOGRAPHY | Age: 68
Discharge: HOME | End: 2023-11-11
Payer: MEDICARE

## 2023-11-08 DIAGNOSIS — Z12.31 SCREENING MAMMOGRAM FOR HIGH-RISK PATIENT: ICD-10-CM

## 2023-11-08 PROCEDURE — 77063 BREAST TOMOSYNTHESIS BI: CPT

## 2023-11-14 ENCOUNTER — OFFICE VISIT (OUTPATIENT)
Age: 68
End: 2023-11-14
Payer: MEDICARE

## 2023-11-14 ENCOUNTER — TELEPHONE (OUTPATIENT)
Age: 68
End: 2023-11-14

## 2023-11-14 VITALS
HEART RATE: 64 BPM | DIASTOLIC BLOOD PRESSURE: 60 MMHG | BODY MASS INDEX: 21.19 KG/M2 | SYSTOLIC BLOOD PRESSURE: 104 MMHG | HEIGHT: 67 IN | WEIGHT: 135 LBS

## 2023-11-14 DIAGNOSIS — I42.8 NONISCHEMIC CARDIOMYOPATHY (HCC): ICD-10-CM

## 2023-11-14 DIAGNOSIS — R06.02 SHORTNESS OF BREATH: ICD-10-CM

## 2023-11-14 DIAGNOSIS — R00.2 PALPITATIONS: Primary | ICD-10-CM

## 2023-11-14 PROCEDURE — 3078F DIAST BP <80 MM HG: CPT | Performed by: INTERNAL MEDICINE

## 2023-11-14 PROCEDURE — 1123F ACP DISCUSS/DSCN MKR DOCD: CPT | Performed by: INTERNAL MEDICINE

## 2023-11-14 PROCEDURE — 3017F COLORECTAL CA SCREEN DOC REV: CPT | Performed by: INTERNAL MEDICINE

## 2023-11-14 PROCEDURE — 3074F SYST BP LT 130 MM HG: CPT | Performed by: INTERNAL MEDICINE

## 2023-11-14 PROCEDURE — 1090F PRES/ABSN URINE INCON ASSESS: CPT | Performed by: INTERNAL MEDICINE

## 2023-11-14 PROCEDURE — G8484 FLU IMMUNIZE NO ADMIN: HCPCS | Performed by: INTERNAL MEDICINE

## 2023-11-14 PROCEDURE — 1036F TOBACCO NON-USER: CPT | Performed by: INTERNAL MEDICINE

## 2023-11-14 PROCEDURE — G8420 CALC BMI NORM PARAMETERS: HCPCS | Performed by: INTERNAL MEDICINE

## 2023-11-14 PROCEDURE — G8399 PT W/DXA RESULTS DOCUMENT: HCPCS | Performed by: INTERNAL MEDICINE

## 2023-11-14 PROCEDURE — G8427 DOCREV CUR MEDS BY ELIG CLIN: HCPCS | Performed by: INTERNAL MEDICINE

## 2023-11-14 PROCEDURE — 93000 ELECTROCARDIOGRAM COMPLETE: CPT | Performed by: INTERNAL MEDICINE

## 2023-11-14 PROCEDURE — 99214 OFFICE O/P EST MOD 30 MIN: CPT | Performed by: INTERNAL MEDICINE

## 2023-11-14 RX ORDER — MINOCYCLINE HYDROCHLORIDE 100 MG/1
CAPSULE ORAL
COMMUNITY
Start: 2023-11-01

## 2023-11-14 ASSESSMENT — ENCOUNTER SYMPTOMS
SHORTNESS OF BREATH: 1
COUGH: 1

## 2023-11-14 NOTE — TELEPHONE ENCOUNTER
Pt called and stated her chest feels very tight and just doesn't feel right. Stated she is having a little bit of a hard time breathing but no current symptoms at this time. Stated she goes to her lung doctor on Thursday so it could be her lungs but just wants to get with Dr. Chidi Esteves and make sure its not her heart. Wanted to ask and see if Dr. Chidi Esteves had anything available today and if so could she come, opening at 12:45 today, offered it to pt and she said she will be here. Please call and advise.

## 2023-11-14 NOTE — PROGRESS NOTES
time.         No follow-ups on file. Thank you for allowing me to participate in this patient's care. Please call or contact me if there are any questions or concerns regarding the above.       Michael Reyes MD  11/14/23  1:00 PM

## 2023-11-14 NOTE — TELEPHONE ENCOUNTER
Spoke to pt. States she does not weigh daily, maybe every other day. States she had gained weight, but now it's back down. Instructed on daily weight protocol. Denies edema. Has appt today at 12:45. Made aware if c/o active chest pain while at office, she will be sent to ER. Verb understanding.

## 2023-11-16 ENCOUNTER — OFFICE VISIT (OUTPATIENT)
Dept: PULMONOLOGY | Age: 68
End: 2023-11-16
Payer: MEDICARE

## 2023-11-16 VITALS
HEART RATE: 80 BPM | WEIGHT: 135.2 LBS | HEIGHT: 67 IN | DIASTOLIC BLOOD PRESSURE: 62 MMHG | OXYGEN SATURATION: 92 % | RESPIRATION RATE: 18 BRPM | BODY MASS INDEX: 21.22 KG/M2 | SYSTOLIC BLOOD PRESSURE: 102 MMHG | TEMPERATURE: 97.2 F

## 2023-11-16 DIAGNOSIS — M06.00 RHEUMATOID ARTHRITIS WITH NEGATIVE RHEUMATOID FACTOR, INVOLVING UNSPECIFIED SITE (HCC): ICD-10-CM

## 2023-11-16 DIAGNOSIS — U09.9 POST COVID-19 CONDITION, UNSPECIFIED: ICD-10-CM

## 2023-11-16 DIAGNOSIS — J98.4 RESTRICTIVE LUNG DISEASE: ICD-10-CM

## 2023-11-16 DIAGNOSIS — J45.40 MODERATE PERSISTENT ASTHMA WITHOUT COMPLICATION: Primary | ICD-10-CM

## 2023-11-16 DIAGNOSIS — R06.09 DYSPNEA ON EXERTION: ICD-10-CM

## 2023-11-16 DIAGNOSIS — R05.3 CHRONIC COUGH: ICD-10-CM

## 2023-11-16 DIAGNOSIS — G47.00 INSOMNIA, UNSPECIFIED TYPE: ICD-10-CM

## 2023-11-16 DIAGNOSIS — I42.8 NICM (NONISCHEMIC CARDIOMYOPATHY) (HCC): ICD-10-CM

## 2023-11-16 LAB
EXPIRATORY TIME: NORMAL
FEF 25-75% %PRED-PRE: NORMAL
FEF 25-75% PRED: NORMAL
FEF 25-75%-PRE: NORMAL
FEV1 %PRED-PRE: 63 %
FEV1 PRED: NORMAL
FEV1/FVC %PRED-PRE: NORMAL
FEV1/FVC PRED: NORMAL
FEV1/FVC: 75 %
FEV1: 1.64 L
FVC %PRED-PRE: 64 %
FVC PRED: NORMAL
FVC: 2.19 L
PEF %PRED-PRE: NORMAL
PEF PRED: NORMAL
PEF-PRE: NORMAL

## 2023-11-16 PROCEDURE — 3017F COLORECTAL CA SCREEN DOC REV: CPT | Performed by: NURSE PRACTITIONER

## 2023-11-16 PROCEDURE — 1123F ACP DISCUSS/DSCN MKR DOCD: CPT | Performed by: NURSE PRACTITIONER

## 2023-11-16 PROCEDURE — G8420 CALC BMI NORM PARAMETERS: HCPCS | Performed by: NURSE PRACTITIONER

## 2023-11-16 PROCEDURE — 1090F PRES/ABSN URINE INCON ASSESS: CPT | Performed by: NURSE PRACTITIONER

## 2023-11-16 PROCEDURE — G8427 DOCREV CUR MEDS BY ELIG CLIN: HCPCS | Performed by: NURSE PRACTITIONER

## 2023-11-16 PROCEDURE — 1036F TOBACCO NON-USER: CPT | Performed by: NURSE PRACTITIONER

## 2023-11-16 PROCEDURE — 94664 DEMO&/EVAL PT USE INHALER: CPT | Performed by: NURSE PRACTITIONER

## 2023-11-16 PROCEDURE — 99204 OFFICE O/P NEW MOD 45 MIN: CPT | Performed by: NURSE PRACTITIONER

## 2023-11-16 PROCEDURE — G8484 FLU IMMUNIZE NO ADMIN: HCPCS | Performed by: NURSE PRACTITIONER

## 2023-11-16 PROCEDURE — 3078F DIAST BP <80 MM HG: CPT | Performed by: NURSE PRACTITIONER

## 2023-11-16 PROCEDURE — 3074F SYST BP LT 130 MM HG: CPT | Performed by: NURSE PRACTITIONER

## 2023-11-16 PROCEDURE — G8399 PT W/DXA RESULTS DOCUMENT: HCPCS | Performed by: NURSE PRACTITIONER

## 2023-11-16 RX ORDER — ALBUTEROL SULFATE 90 UG/1
2 AEROSOL, METERED RESPIRATORY (INHALATION) EVERY 4 HOURS PRN
Qty: 1 EACH | Refills: 11 | Status: SHIPPED | OUTPATIENT
Start: 2023-11-16

## 2023-11-16 RX ORDER — FLUTICASONE FUROATE AND VILANTEROL 100; 25 UG/1; UG/1
1 POWDER RESPIRATORY (INHALATION) DAILY
Qty: 1 EACH | Refills: 11 | Status: SHIPPED | OUTPATIENT
Start: 2023-11-16

## 2023-11-16 RX ORDER — ALBUTEROL SULFATE 2.5 MG/3ML
2.5 SOLUTION RESPIRATORY (INHALATION) 4 TIMES DAILY PRN
Qty: 120 EACH | Refills: 11 | Status: SHIPPED | OUTPATIENT
Start: 2023-11-16

## 2023-11-16 ASSESSMENT — PULMONARY FUNCTION TESTS
FVC_PERCENT_PREDICTED_PRE: 64
FEV1_PERCENT_PREDICTED_PRE: 63
FVC: 2.19
FEV1/FVC: 75
FEV1: 1.64

## 2023-11-16 NOTE — PROGRESS NOTES
No results found for this or any previous visit from the past 3650 days. PFTs:       Latest Ref Rng & Units 11/16/2023     2:11 PM   Office Spirometry Results   FVC L 2.19    FEV1 L 1.64    FEV1 %Pred-Pre % 63    FVC %Pred-Pre % 64    FEV1/FVC % 75      No results found for this or any previous visit. No results found for this or any previous visit. FeNO: No results found for this or any previous visit. FeNO and Likelihood of Eosinophilic Asthma   Unlikely Intermediate Likely   <25 ppb 25-50 ppb >50ppb     Exercise Oximetry:  O2 sat on room air at rest is 95% and resting heart rate is 80 bpm.  After walking for 4.5 minutes, lowest O2 sat is 93% and maximum heart rate is 96 bpm.    Echo:   TRANSTHORACIC ECHOCARDIOGRAM (TTE) LIMITED (CONTRAST/BUBBLE/3D PRN) 12/21/2022    Interpretation Summary    Left Ventricle: Mildly reduced left ventricular systolic function with a visually estimated EF of 45 - 50%. Left ventricle size is normal. Normal wall thickness. Mild global hypokinesis present. Normal diastolic function. Technical qualifiers: Color flow Doppler was performed and pulse wave and/or continuous wave Doppler was performed.       [facility] Reference Info:                                                                                                                  Immunization History   Administered Date(s) Administered    COVID-19, MODERNA BLUE border, Primary or Immunocompromised, (age 12y+), IM, 100 mcg/0.5mL 02/17/2021, 03/17/2021    Influenza Quadv 10/08/2015, 10/11/2016    Influenza Trivalent 11/27/2014, 11/01/2016    Influenza, High Dose (Fluzone 65 yrs and older) 02/19/2019, 10/15/2019    Pneumococcal, PCV-13, PREVNAR 13, (age 6w+), IM, 0.5mL 02/19/2019    Pneumococcal, PPSV23, PNEUMOVAX 23, (age 2y+), SC/IM, 0.5mL 11/15/2017    TDaP, ADACEL (age 6y-58y), BOOSTRIX (age 10y+), IM, 0.5mL 05/25/2012, 08/10/2015, 05/26/2021    Zoster Recombinant (Shingrix) 10/15/2019     Past Medical History:

## 2023-11-16 NOTE — PATIENT INSTRUCTIONS
[facility] and [address], [address] [place] [address], [address] [place] [address]  T: [phone]       MEDS TOO EXPENSIVE? ? We understand that insurance companies have different 'preferred' medications. These preferrences can change yearly and can be difficult to track. Depending upon your insurance and their preferred medicines, some medications we prescribe may be too expensive. If this happens, we recommend that you find out what inhalers for COPD or asthma are \"preferred\" on your insurance drug formulary by calling the member services phone number on the back of the insurance card. The cost of your medication can be dramatically different depending on this. Once the preferred inhalers are known, please call us back at [phone] with this. We will then choose the best option available for you and send that prescription to your pharmacy on file.       ICS/LABA Inhalers:  Fluticasone/Salmeterol inhaler (Advair, Airduo, Wixela)  Alejandra Powell

## 2023-11-24 ENCOUNTER — HOSPITAL ENCOUNTER (OUTPATIENT)
Dept: CT IMAGING | Age: 68
End: 2023-11-24
Payer: MEDICARE

## 2023-11-24 DIAGNOSIS — U09.9 POST COVID-19 CONDITION, UNSPECIFIED: ICD-10-CM

## 2023-11-24 DIAGNOSIS — M06.00 RHEUMATOID ARTHRITIS WITH NEGATIVE RHEUMATOID FACTOR, INVOLVING UNSPECIFIED SITE (HCC): ICD-10-CM

## 2023-11-24 DIAGNOSIS — J98.4 RESTRICTIVE LUNG DISEASE: ICD-10-CM

## 2023-11-24 PROCEDURE — 71250 CT THORAX DX C-: CPT

## 2023-11-26 NOTE — RESULT ENCOUNTER NOTE
Please let patient know the good news--CT scan is normal.  No evidence of scarring on her lungs from Covid or her RA.

## 2023-11-29 ENCOUNTER — TELEPHONE (OUTPATIENT)
Age: 68
End: 2023-11-29

## 2023-12-27 NOTE — TELEPHONE ENCOUNTER
Spoke to pt about the med. Pt already did the renew paperwork with pt assistance and just needed the RX to be faxed. Rx printed and faxed on 12/27. Pt voiced understanding.

## 2023-12-27 NOTE — TELEPHONE ENCOUNTER
Please call or fax Rx for Entresto to [facility]. Please do this before the end of the year.  Please call patient to let her know this has been done

## 2024-01-09 NOTE — TELEPHONE ENCOUNTER
Medication, strength, directions and requested pharmacy reviewed/verified. Prescription pending physician's approval.

## 2024-02-20 NOTE — PROGRESS NOTES
[facility] CARDIOLOGY  [address] [place] [address], [address]  [place] [address]  PHONE: [phone]      24    NAME:  Indy Haque  : 1955  MRN: [id number]         SUBJECTIVE:   Indy Haque is a 68 y.o. female seen for a follow up visit regarding the following:     Chief Complaint   Patient presents with    Results    Hypertension            HPI:  Follow up  Results and Hypertension   .    Follow up NICM, EF recovered to near normal, recovering alcoholic. Didn't tolerate SGLT2i therapy.  looks great, feels very well but dealing with some reflux at the moment, PCP added PPI.  She remains off alcohol, has started an exercise program and no HF symptoms.          PAST CARDIAC HISTORY:  Alcohol abuse  2022      COVID 19 infection, outpatient care  Aug 2022     3 day monitor reviewed - NSR, frequent unifocal pvc (11.3%), rare brief AT, longest 2.9 seconds     NST - anterior artifact, EF 29%     Echo - EF 35-40%, dilated LV     LHC - normal coronaries, EF 25-30%, LVE, EDP 12  Dec 2022       EF 45-50%  2023       EF 60-65%, ind DF, RVSP 30              Key CAD CHF Meds            metoprolol succinate (TOPROL XL) 25 MG extended release tablet (Taking)    Sig: TAKE 1 TABLET BY MOUTH IN THE MORNING AND IN THE EVENING    sacubitril-valsartan (ENTRESTO) 24-26 MG per tablet (Taking)    Sig - Route: Take 1 tablet by mouth 2 times daily Does not have enough to last until mail order supply later this week - Oral          Key Antihyperglycemic Medications       Patient is on no antihyperglycemic meds.                Past Medical History, Past Surgical History, Family history, Social History, and Medications were all reviewed with the patient today and updated as necessary.     Prior to Admission medications    Medication Sig Start Date End Date Taking? Authorizing Provider   pantoprazole (PROTONIX) 40 MG tablet Take 1 tablet by mouth daily 24  Yes Brittany Powell, APRN - CNP   famotidine (PEPCID)

## 2024-02-20 NOTE — PROGRESS NOTES
Name:  Indy Haque  YOB: 1955   MRN: [id number]      Office Visit: 2/21/2024        ASSESSMENT AND PLAN:  (Medical Decision Making)    Impression: 68 y.o. female with asthma (positive methacholine challenge in 2019) and history of RA-worsening cough and dyspnea since COVID in January, 2023.    1. Moderate persistent asthma without complication  --She is doing well on Breo.  She is using albuterol once daily.  She will continue current therapy.    2. Chronic cough  --This has improved with treatment of her asthma, but still present.  This has been present since she had COVID.  She is having worsening reflux symptoms which is probably contributing to her cough.  She had negative high-resolution CT of chest.    3. Rheumatoid arthritis with negative rheumatoid factor, involving unspecified site (HCC)  --No evidence of ILD on CT scan.    4. Gastroesophageal reflux disease without esophagitis  --Worse.  She is to continue on her Protonix in the morning and we will add Pepcid 20 mg at bedtime.  GERD instructions reviewed with patient--  1.  Limit caffeine and alcohol consumption to one serving daily.  May need to eliminate totally if symptoms do not resolve.  2.  Avoid known triggers.  3.  Elevate head of bed on 6 inch blocks.  4.  Avoid eating and drinking within 2 hours of bedtime.  5.  Avoid spicy and fried foods.      5. Chronic systolic CHF (congestive heart failure) (HCC)  --Contributing to her dyspnea on exertion-followed by cardiology.  This seems to be improved currently.    Orders Placed This Encounter   Medications    pantoprazole (PROTONIX) 40 MG tablet     Sig: Take 1 tablet by mouth daily     Dispense:  30 tablet     Refill:  5    famotidine (PEPCID) 20 MG tablet     Sig: Take 1 tablet by mouth nightly     Dispense:  30 tablet     Refill:  5     No orders of the defined types were placed in this encounter.    Follow-up and Dispositions    Return in about 6 months (around 8/21/2024) for

## 2024-02-21 ENCOUNTER — OFFICE VISIT (OUTPATIENT)
Age: 69
End: 2024-02-21
Payer: MEDICARE

## 2024-02-21 ENCOUNTER — NURSE ONLY (OUTPATIENT)
Dept: PULMONOLOGY | Age: 69
End: 2024-02-21

## 2024-02-21 ENCOUNTER — OFFICE VISIT (OUTPATIENT)
Dept: PULMONOLOGY | Age: 69
End: 2024-02-21
Payer: MEDICARE

## 2024-02-21 VITALS
RESPIRATION RATE: 19 BRPM | DIASTOLIC BLOOD PRESSURE: 84 MMHG | WEIGHT: 137 LBS | TEMPERATURE: 97.2 F | SYSTOLIC BLOOD PRESSURE: 130 MMHG | BODY MASS INDEX: 21.5 KG/M2 | HEIGHT: 67 IN | HEART RATE: 64 BPM | OXYGEN SATURATION: 97 %

## 2024-02-21 VITALS
WEIGHT: 140 LBS | DIASTOLIC BLOOD PRESSURE: 88 MMHG | BODY MASS INDEX: 21.97 KG/M2 | HEIGHT: 67 IN | HEART RATE: 65 BPM | SYSTOLIC BLOOD PRESSURE: 138 MMHG

## 2024-02-21 DIAGNOSIS — I42.0 DILATED CARDIOMYOPATHY (HCC): Primary | ICD-10-CM

## 2024-02-21 DIAGNOSIS — K21.9 GASTROESOPHAGEAL REFLUX DISEASE WITHOUT ESOPHAGITIS: ICD-10-CM

## 2024-02-21 DIAGNOSIS — J45.40 MODERATE PERSISTENT ASTHMA WITHOUT COMPLICATION: Primary | ICD-10-CM

## 2024-02-21 DIAGNOSIS — I50.22 CHRONIC SYSTOLIC CHF (CONGESTIVE HEART FAILURE) (HCC): ICD-10-CM

## 2024-02-21 DIAGNOSIS — M06.00 RHEUMATOID ARTHRITIS WITH NEGATIVE RHEUMATOID FACTOR, INVOLVING UNSPECIFIED SITE (HCC): ICD-10-CM

## 2024-02-21 DIAGNOSIS — R05.3 CHRONIC COUGH: ICD-10-CM

## 2024-02-21 DIAGNOSIS — R06.09 DYSPNEA ON EXERTION: Primary | ICD-10-CM

## 2024-02-21 LAB
FEV 1 , POC: 1.93 L
FEV1 % PRED, POC: 83 %
FEV1/FVC, POC: 76
FVC % PRED, POC: 82 %
FVC, POC: 2.53

## 2024-02-21 PROCEDURE — 3079F DIAST BP 80-89 MM HG: CPT | Performed by: INTERNAL MEDICINE

## 2024-02-21 PROCEDURE — 3017F COLORECTAL CA SCREEN DOC REV: CPT | Performed by: INTERNAL MEDICINE

## 2024-02-21 PROCEDURE — 3075F SYST BP GE 130 - 139MM HG: CPT | Performed by: INTERNAL MEDICINE

## 2024-02-21 PROCEDURE — 1123F ACP DISCUSS/DSCN MKR DOCD: CPT | Performed by: NURSE PRACTITIONER

## 2024-02-21 PROCEDURE — G8484 FLU IMMUNIZE NO ADMIN: HCPCS | Performed by: INTERNAL MEDICINE

## 2024-02-21 PROCEDURE — 1090F PRES/ABSN URINE INCON ASSESS: CPT | Performed by: NURSE PRACTITIONER

## 2024-02-21 PROCEDURE — 99214 OFFICE O/P EST MOD 30 MIN: CPT | Performed by: NURSE PRACTITIONER

## 2024-02-21 PROCEDURE — 1123F ACP DISCUSS/DSCN MKR DOCD: CPT | Performed by: INTERNAL MEDICINE

## 2024-02-21 PROCEDURE — G8484 FLU IMMUNIZE NO ADMIN: HCPCS | Performed by: NURSE PRACTITIONER

## 2024-02-21 PROCEDURE — G8420 CALC BMI NORM PARAMETERS: HCPCS | Performed by: NURSE PRACTITIONER

## 2024-02-21 PROCEDURE — G8399 PT W/DXA RESULTS DOCUMENT: HCPCS | Performed by: INTERNAL MEDICINE

## 2024-02-21 PROCEDURE — G8427 DOCREV CUR MEDS BY ELIG CLIN: HCPCS | Performed by: INTERNAL MEDICINE

## 2024-02-21 PROCEDURE — 1036F TOBACCO NON-USER: CPT | Performed by: NURSE PRACTITIONER

## 2024-02-21 PROCEDURE — G8427 DOCREV CUR MEDS BY ELIG CLIN: HCPCS | Performed by: NURSE PRACTITIONER

## 2024-02-21 PROCEDURE — 3075F SYST BP GE 130 - 139MM HG: CPT | Performed by: NURSE PRACTITIONER

## 2024-02-21 PROCEDURE — 1090F PRES/ABSN URINE INCON ASSESS: CPT | Performed by: INTERNAL MEDICINE

## 2024-02-21 PROCEDURE — 3079F DIAST BP 80-89 MM HG: CPT | Performed by: NURSE PRACTITIONER

## 2024-02-21 PROCEDURE — G8399 PT W/DXA RESULTS DOCUMENT: HCPCS | Performed by: NURSE PRACTITIONER

## 2024-02-21 PROCEDURE — 99213 OFFICE O/P EST LOW 20 MIN: CPT | Performed by: INTERNAL MEDICINE

## 2024-02-21 PROCEDURE — 3017F COLORECTAL CA SCREEN DOC REV: CPT | Performed by: NURSE PRACTITIONER

## 2024-02-21 PROCEDURE — 1036F TOBACCO NON-USER: CPT | Performed by: INTERNAL MEDICINE

## 2024-02-21 PROCEDURE — G8420 CALC BMI NORM PARAMETERS: HCPCS | Performed by: INTERNAL MEDICINE

## 2024-02-21 RX ORDER — PANTOPRAZOLE SODIUM 40 MG/1
40 TABLET, DELAYED RELEASE ORAL DAILY
Qty: 30 TABLET | Refills: 5 | Status: SHIPPED | OUTPATIENT
Start: 2024-02-21

## 2024-02-21 RX ORDER — FAMOTIDINE 20 MG/1
20 TABLET, FILM COATED ORAL
Qty: 30 TABLET | Refills: 5 | Status: SHIPPED | OUTPATIENT
Start: 2024-02-21

## 2024-02-21 RX ORDER — METOPROLOL SUCCINATE 25 MG/1
TABLET, EXTENDED RELEASE ORAL
Qty: 180 TABLET | Refills: 3 | Status: SHIPPED | OUTPATIENT
Start: 2024-02-21

## 2024-02-21 ASSESSMENT — PULMONARY FUNCTION TESTS
FVC_POC: 2.53
FEV1_PERCENT_PREDICTED_POC: 83
FEV1/FVC_POC: 76
FVC_PERCENT_PREDICTED_POC: 82

## 2024-02-21 ASSESSMENT — ENCOUNTER SYMPTOMS: SHORTNESS OF BREATH: 0

## 2024-03-08 ENCOUNTER — TELEPHONE (OUTPATIENT)
Dept: ORTHOPEDIC SURGERY | Age: 69
End: 2024-03-08

## 2024-03-25 ENCOUNTER — OFFICE VISIT (OUTPATIENT)
Dept: ORTHOPEDIC SURGERY | Age: 69
End: 2024-03-25
Payer: MEDICARE

## 2024-03-25 DIAGNOSIS — M47.816 LUMBAR SPONDYLOSIS: Primary | ICD-10-CM

## 2024-03-25 DIAGNOSIS — M41.25 OTHER IDIOPATHIC SCOLIOSIS, THORACOLUMBAR REGION: ICD-10-CM

## 2024-03-25 DIAGNOSIS — M51.36 DISC DEGENERATION, LUMBAR: ICD-10-CM

## 2024-03-25 DIAGNOSIS — M48.062 SPINAL STENOSIS, LUMBAR REGION WITH NEUROGENIC CLAUDICATION: ICD-10-CM

## 2024-03-25 PROCEDURE — 62323 NJX INTERLAMINAR LMBR/SAC: CPT | Performed by: PHYSICAL MEDICINE & REHABILITATION

## 2024-03-25 RX ORDER — BETAMETHASONE SODIUM PHOSPHATE AND BETAMETHASONE ACETATE 3; 3 MG/ML; MG/ML
12 INJECTION, SUSPENSION INTRA-ARTICULAR; INTRALESIONAL; INTRAMUSCULAR; SOFT TISSUE ONCE
Status: COMPLETED | OUTPATIENT
Start: 2024-03-25 | End: 2024-03-25

## 2024-03-25 RX ADMIN — BETAMETHASONE SODIUM PHOSPHATE AND BETAMETHASONE ACETATE 12 MG: 3; 3 INJECTION, SUSPENSION INTRA-ARTICULAR; INTRALESIONAL; INTRAMUSCULAR; SOFT TISSUE at 11:25

## 2024-03-25 NOTE — PROGRESS NOTES
Name: Indy Haque  YOB: 1955  Gender: female  MRN: [id number]        Interlaminar CHANTE Procedure Note      Procedure: L5-S1 interlaminar epidural steroid injection    Precautions: Indy Haque denies prior sensitivity to steroid, local anesthetic, iodine, or shellfish.       Consent:  Consent was obtained prior to the procedure. The procedure was discussed at length with Indy Haque. She was given the opportunity to ask questions regarding the procedure and its associated risks.  In addition to the potential risks associated with the procedure itself, the patient was informed both verbally and in writing of potential side effects of the use glucocorticoids.  The patient appeared to comprehend the informed consent and desired to have the procedure performed, and informed consent was signed.     She was placed in a prone position on the fluoroscopy table and the skin was prepped and draped in a routine sterile fashion. The areas to be injected were each anesthetized with 1 ml of 1% Lidocaine. A 22 gauge 3.5 inch spinal needle was carefully advanced under fluoroscopic guidance to the L5-S1 interlaminar space (left paramedian). 0.5 ml of 70% of Omnipaque was injected to confirm proper needle placement and absence of subdural or vascular flow Once proper placement was confirmed, 2ml of sterile water and 12 mg of betamethasone were injected through the spinal needle.       Fluoroscopic guidance was used intermittently over a 10-minute period to insure proper needle placement and her safety. A hard copy of the fluoroscopic image has been placed in her chart and is saved on the C-arm hard drive. She was monitored for 30 minutes after the procedure and discharged home in a stable fashion with a routine follow up.    Procedural Diagnosis:     ICD-10-CM    1. Lumbar spondylosis  M47.816 XR INJ SPINE THER SUBST LUM/SAC W IMG     betamethasone acetate-betamethasone sodium phosphate (CELESTONE)

## 2024-05-21 PROBLEM — I50.22 CHRONIC SYSTOLIC CHF (CONGESTIVE HEART FAILURE) (HCC): Status: RESOLVED | Noted: 2022-09-21 | Resolved: 2024-05-21

## 2024-05-21 NOTE — PROGRESS NOTES
Brittany Powell APRN - CNP   metoprolol succinate (TOPROL XL) 25 MG extended release tablet TAKE 1 TABLET BY MOUTH IN THE MORNING AND IN THE EVENING 2/21/24  Yes Jenni Fine MD   sacubitril-valsartan (ENTRESTO) 24-26 MG per tablet Take 1 tablet by mouth 2 times daily Does not have enough to last until mail order supply later this week 1/9/24  Yes Jenni Fine MD   albuterol (PROVENTIL) (2.5 MG/3ML) 0.083% nebulizer solution Take 3 mLs by nebulization 4 times daily as needed for Wheezing Dx code j45.40 11/16/23  Yes Brittany Powell APRN - CNP   albuterol sulfate HFA (PROVENTIL;VENTOLIN;PROAIR) 108 (90 Base) MCG/ACT inhaler Inhale 2 puffs into the lungs every 4 hours as needed for Shortness of Breath 11/16/23  Yes Brittany Powell APRN - CNP   fluticasone furoate-vilanterol (BREO ELLIPTA) 100-25 MCG/ACT inhaler Inhale 1 puff into the lungs daily 11/16/23  Yes Brittany Powell APRN - CNP   amphetamine-dextroamphetamine (ADDERALL) 20 MG tablet  2/22/23  Yes Llay Sanchez MD   fluticasone-umeclidin-vilant (TRELEGY ELLIPTA) 200-62.5-25 MCG/ACT AEPB inhaler Inhale 1 puff into the lungs daily 3/21/23  Yes Kassie Ramirez APRN - NP   Nebulizers (COMPRESSOR/NEBULIZER) MISC 1 each by Does not apply route 4 times daily as needed (wheezing) 3/21/23  Yes Kassie Ramirez APRN - NP   Respiratory Therapy Supplies (NEBULIZER/TUBING/MOUTHPIECE) KIT 1 kit by Does not apply route daily as needed (q 4 hours prn cough wheeze) 3/21/23  Yes Kassie Ramirez APRN - NP   hyoscyamine (LEVSIN/SL) 125 MCG sublingual tablet PLACE 1 TABLET BY SUBLINGUAL ROUTE EVERY 6 HOURS AS NEEDED FOR PAIN 11/15/22  Yes Anthony Goodson MD   LINZESS 72 MCG CAPS capsule Take 1 capsule by mouth every morning (before breakfast) 11/16/22  Yes Jeny Long MD   azelastine (ASTELIN) 0.1 % nasal spray 1 spray by Nasal route 2 times daily 5/21/19  Yes Automatic Reconciliation, Ar   minocycline (MINOCIN;DYNACIN) 100 MG

## 2024-05-22 ENCOUNTER — OFFICE VISIT (OUTPATIENT)
Age: 69
End: 2024-05-22
Payer: MEDICARE

## 2024-05-22 ENCOUNTER — TELEPHONE (OUTPATIENT)
Dept: ORTHOPEDIC SURGERY | Age: 69
End: 2024-05-22

## 2024-05-22 VITALS
HEIGHT: 67 IN | SYSTOLIC BLOOD PRESSURE: 114 MMHG | HEART RATE: 54 BPM | DIASTOLIC BLOOD PRESSURE: 78 MMHG | WEIGHT: 137.2 LBS | BODY MASS INDEX: 21.53 KG/M2

## 2024-05-22 DIAGNOSIS — I10 ESSENTIAL HYPERTENSION: ICD-10-CM

## 2024-05-22 DIAGNOSIS — M47.816 LUMBAR SPONDYLOSIS: Primary | ICD-10-CM

## 2024-05-22 DIAGNOSIS — M48.062 SPINAL STENOSIS, LUMBAR REGION WITH NEUROGENIC CLAUDICATION: ICD-10-CM

## 2024-05-22 DIAGNOSIS — M51.36 DISC DEGENERATION, LUMBAR: ICD-10-CM

## 2024-05-22 DIAGNOSIS — I42.0 DILATED CARDIOMYOPATHY (HCC): Primary | ICD-10-CM

## 2024-05-22 PROCEDURE — 3074F SYST BP LT 130 MM HG: CPT | Performed by: INTERNAL MEDICINE

## 2024-05-22 PROCEDURE — G8427 DOCREV CUR MEDS BY ELIG CLIN: HCPCS | Performed by: INTERNAL MEDICINE

## 2024-05-22 PROCEDURE — 99214 OFFICE O/P EST MOD 30 MIN: CPT | Performed by: INTERNAL MEDICINE

## 2024-05-22 PROCEDURE — 3017F COLORECTAL CA SCREEN DOC REV: CPT | Performed by: INTERNAL MEDICINE

## 2024-05-22 PROCEDURE — 1090F PRES/ABSN URINE INCON ASSESS: CPT | Performed by: INTERNAL MEDICINE

## 2024-05-22 PROCEDURE — G8399 PT W/DXA RESULTS DOCUMENT: HCPCS | Performed by: INTERNAL MEDICINE

## 2024-05-22 PROCEDURE — 3078F DIAST BP <80 MM HG: CPT | Performed by: INTERNAL MEDICINE

## 2024-05-22 PROCEDURE — G8420 CALC BMI NORM PARAMETERS: HCPCS | Performed by: INTERNAL MEDICINE

## 2024-05-22 PROCEDURE — 1123F ACP DISCUSS/DSCN MKR DOCD: CPT | Performed by: INTERNAL MEDICINE

## 2024-05-22 PROCEDURE — 1036F TOBACCO NON-USER: CPT | Performed by: INTERNAL MEDICINE

## 2024-05-22 ASSESSMENT — ENCOUNTER SYMPTOMS: SHORTNESS OF BREATH: 0

## 2024-06-03 ENCOUNTER — OFFICE VISIT (OUTPATIENT)
Dept: ORTHOPEDIC SURGERY | Age: 69
End: 2024-06-03
Payer: MEDICARE

## 2024-06-03 DIAGNOSIS — M47.816 LUMBAR SPONDYLOSIS: Primary | ICD-10-CM

## 2024-06-03 DIAGNOSIS — M48.062 SPINAL STENOSIS, LUMBAR REGION WITH NEUROGENIC CLAUDICATION: ICD-10-CM

## 2024-06-03 DIAGNOSIS — M51.36 DISC DEGENERATION, LUMBAR: ICD-10-CM

## 2024-06-03 PROCEDURE — 62323 NJX INTERLAMINAR LMBR/SAC: CPT | Performed by: PHYSICAL MEDICINE & REHABILITATION

## 2024-06-03 RX ORDER — TRIAMCINOLONE ACETONIDE 40 MG/ML
40 INJECTION, SUSPENSION INTRA-ARTICULAR; INTRAMUSCULAR ONCE
Status: COMPLETED | OUTPATIENT
Start: 2024-06-03 | End: 2024-06-03

## 2024-06-03 RX ADMIN — TRIAMCINOLONE ACETONIDE 40 MG: 40 INJECTION, SUSPENSION INTRA-ARTICULAR; INTRAMUSCULAR at 09:57

## 2024-06-03 NOTE — PROGRESS NOTES
Name: Indy Haque  YOB: 1955  Gender: female  MRN: [id number]        Interlaminar CHANTE Procedure Note      Procedure: L4-L5 interlaminar epidural steroid injection    Precautions: Indy Haque denies prior sensitivity to steroid, local anesthetic, iodine, or shellfish.     She has low back pain that radiates to the right anterolateral thigh.    Consent:  Consent was obtained prior to the procedure. The procedure was discussed at length with Indy Haque. She was given the opportunity to ask questions regarding the procedure and its associated risks.  In addition to the potential risks associated with the procedure itself, the patient was informed both verbally and in writing of potential side effects of the use glucocorticoids.  The patient appeared to comprehend the informed consent and desired to have the procedure performed, and informed consent was signed.     She was placed in a prone position on the fluoroscopy table and the skin was prepped and draped in a routine sterile fashion. The areas to be injected were each anesthetized with 1 ml of 1% Lidocaine. A 22 gauge 3.5 inch spinal needle was carefully advanced under fluoroscopic guidance to the L4-L5 interlaminar space (right paramedian). 0.5 ml of 70% of Omnipaque was injected to confirm proper needle placement and absence of subdural or vascular flow Once proper placement was confirmed, 2ml of sterile water and 12 mg of betamethasone were injected through the spinal needle.       Fluoroscopic guidance was used intermittently over a 10-minute period to insure proper needle placement and her safety. A hard copy of the fluoroscopic image has been placed in her chart and is saved on the C-arm hard drive. She was monitored for 30 minutes after the procedure and discharged home in a stable fashion with a routine follow up.    Procedural Diagnosis:     ICD-10-CM    1. Lumbar spondylosis  M47.816 XR INJ SPINE THER SUBST LUM/SAC W

## 2024-06-25 ENCOUNTER — HOSPITAL ENCOUNTER (OUTPATIENT)
Dept: GENERAL RADIOLOGY | Age: 69
Discharge: HOME | End: 2024-06-28
Payer: MEDICARE

## 2024-06-25 DIAGNOSIS — M06.4 INFLAMMATORY POLYARTHRITIS (HCC): ICD-10-CM

## 2024-06-25 PROCEDURE — 72202 X-RAY EXAM SI JOINTS 3/> VWS: CPT

## 2024-06-25 PROCEDURE — 73120 X-RAY EXAM OF HAND: CPT

## 2024-06-25 PROCEDURE — 73620 X-RAY EXAM OF FOOT: CPT

## 2024-08-06 ENCOUNTER — OFFICE VISIT (OUTPATIENT)
Dept: FAMILY MEDICINE CLINIC | Facility: CLINIC | Age: 69
End: 2024-08-06
Payer: MEDICARE

## 2024-08-06 VITALS
WEIGHT: 137 LBS | HEART RATE: 64 BPM | SYSTOLIC BLOOD PRESSURE: 110 MMHG | DIASTOLIC BLOOD PRESSURE: 70 MMHG | HEIGHT: 66 IN | BODY MASS INDEX: 22.02 KG/M2 | TEMPERATURE: 97.8 F

## 2024-08-06 DIAGNOSIS — I50.9 CHRONIC CONGESTIVE HEART FAILURE, UNSPECIFIED HEART FAILURE TYPE (HCC): ICD-10-CM

## 2024-08-06 DIAGNOSIS — I42.0 DILATED CARDIOMYOPATHY (HCC): ICD-10-CM

## 2024-08-06 DIAGNOSIS — Z00.00 MEDICARE ANNUAL WELLNESS VISIT, SUBSEQUENT: Primary | ICD-10-CM

## 2024-08-06 LAB
ALBUMIN SERPL-MCNC: 3.7 G/DL (ref 3.2–4.6)
ALBUMIN/GLOB SERPL: 1.2 (ref 1–1.9)
ALP SERPL-CCNC: 59 U/L (ref 35–104)
ALT SERPL-CCNC: 15 U/L (ref 12–65)
ANION GAP SERPL CALC-SCNC: 10 MMOL/L (ref 9–18)
AST SERPL-CCNC: 24 U/L (ref 15–37)
BILIRUB SERPL-MCNC: 0.3 MG/DL (ref 0–1.2)
BUN SERPL-MCNC: 15 MG/DL (ref 8–23)
CALCIUM SERPL-MCNC: 9.3 MG/DL (ref 8.8–10.2)
CHLORIDE SERPL-SCNC: 100 MMOL/L (ref 98–107)
CHOLEST SERPL-MCNC: 201 MG/DL (ref 0–200)
CO2 SERPL-SCNC: 27 MMOL/L (ref 20–28)
CREAT SERPL-MCNC: 0.77 MG/DL (ref 0.6–1.1)
GLOBULIN SER CALC-MCNC: 3.1 G/DL (ref 2.3–3.5)
GLUCOSE SERPL-MCNC: 94 MG/DL (ref 70–99)
HDLC SERPL-MCNC: 67 MG/DL (ref 40–60)
HDLC SERPL: 3 (ref 0–5)
LDLC SERPL CALC-MCNC: 97 MG/DL (ref 0–100)
POTASSIUM SERPL-SCNC: 4.5 MMOL/L (ref 3.5–5.1)
PROT SERPL-MCNC: 6.8 G/DL (ref 6.3–8.2)
SODIUM SERPL-SCNC: 137 MMOL/L (ref 136–145)
TRIGL SERPL-MCNC: 185 MG/DL (ref 0–150)
VLDLC SERPL CALC-MCNC: 37 MG/DL (ref 6–23)

## 2024-08-06 PROCEDURE — 3074F SYST BP LT 130 MM HG: CPT | Performed by: NURSE PRACTITIONER

## 2024-08-06 PROCEDURE — G0439 PPPS, SUBSEQ VISIT: HCPCS | Performed by: NURSE PRACTITIONER

## 2024-08-06 PROCEDURE — 3078F DIAST BP <80 MM HG: CPT | Performed by: NURSE PRACTITIONER

## 2024-08-06 PROCEDURE — 1123F ACP DISCUSS/DSCN MKR DOCD: CPT | Performed by: NURSE PRACTITIONER

## 2024-08-06 PROCEDURE — 3017F COLORECTAL CA SCREEN DOC REV: CPT | Performed by: NURSE PRACTITIONER

## 2024-08-06 RX ORDER — ESTRADIOL 1 MG/1
1 TABLET ORAL DAILY
COMMUNITY
Start: 2024-07-01

## 2024-08-06 RX ORDER — COLCHICINE 0.6 MG/1
0.6 TABLET ORAL 2 TIMES DAILY PRN
COMMUNITY
Start: 2024-06-25

## 2024-08-06 SDOH — ECONOMIC STABILITY: INCOME INSECURITY: HOW HARD IS IT FOR YOU TO PAY FOR THE VERY BASICS LIKE FOOD, HOUSING, MEDICAL CARE, AND HEATING?: NOT HARD AT ALL

## 2024-08-06 SDOH — ECONOMIC STABILITY: FOOD INSECURITY: WITHIN THE PAST 12 MONTHS, YOU WORRIED THAT YOUR FOOD WOULD RUN OUT BEFORE YOU GOT MONEY TO BUY MORE.: NEVER TRUE

## 2024-08-06 SDOH — ECONOMIC STABILITY: FOOD INSECURITY: WITHIN THE PAST 12 MONTHS, THE FOOD YOU BOUGHT JUST DIDN'T LAST AND YOU DIDN'T HAVE MONEY TO GET MORE.: NEVER TRUE

## 2024-08-06 ASSESSMENT — PATIENT HEALTH QUESTIONNAIRE - PHQ9
SUM OF ALL RESPONSES TO PHQ9 QUESTIONS 1 & 2: 0
SUM OF ALL RESPONSES TO PHQ QUESTIONS 1-9: 0
2. FEELING DOWN, DEPRESSED OR HOPELESS: NOT AT ALL
SUM OF ALL RESPONSES TO PHQ QUESTIONS 1-9: 0
SUM OF ALL RESPONSES TO PHQ QUESTIONS 1-9: 0
1. LITTLE INTEREST OR PLEASURE IN DOING THINGS: NOT AT ALL
SUM OF ALL RESPONSES TO PHQ QUESTIONS 1-9: 0
1. LITTLE INTEREST OR PLEASURE IN DOING THINGS: NOT AT ALL
SUM OF ALL RESPONSES TO PHQ9 QUESTIONS 1 & 2: 0
SUM OF ALL RESPONSES TO PHQ QUESTIONS 1-9: 0
2. FEELING DOWN, DEPRESSED OR HOPELESS: NOT AT ALL
SUM OF ALL RESPONSES TO PHQ QUESTIONS 1-9: 0
2. FEELING DOWN, DEPRESSED OR HOPELESS: NOT AT ALL
SUM OF ALL RESPONSES TO PHQ QUESTIONS 1-9: 0
SUM OF ALL RESPONSES TO PHQ QUESTIONS 1-9: 0
SUM OF ALL RESPONSES TO PHQ9 QUESTIONS 1 & 2: 0
1. LITTLE INTEREST OR PLEASURE IN DOING THINGS: NOT AT ALL

## 2024-08-06 ASSESSMENT — SOCIAL DETERMINANTS OF HEALTH (SDOH)
WITHIN THE LAST YEAR, HAVE TO BEEN RAPED OR FORCED TO HAVE ANY KIND OF SEXUAL ACTIVITY BY YOUR PARTNER OR EX-PARTNER?: NO
WITHIN THE LAST YEAR, HAVE YOU BEEN KICKED, HIT, SLAPPED, OR OTHERWISE PHYSICALLY HURT BY YOUR PARTNER OR EX-PARTNER?: NO
WITHIN THE LAST YEAR, HAVE YOU BEEN AFRAID OF YOUR PARTNER OR EX-PARTNER?: NO
HOW OFTEN DO YOU ATTENT MEETINGS OF THE CLUB OR ORGANIZATION YOU BELONG TO?: NEVER
HOW OFTEN DO YOU GET TOGETHER WITH FRIENDS OR RELATIVES?: ONCE A WEEK
HOW OFTEN DO YOU ATTEND CHURCH OR RELIGIOUS SERVICES?: MORE THAN 4 TIMES PER YEAR
DO YOU BELONG TO ANY CLUBS OR ORGANIZATIONS SUCH AS CHURCH GROUPS UNIONS, FRATERNAL OR ATHLETIC GROUPS, OR SCHOOL GROUPS?: NO
IN A TYPICAL WEEK, HOW MANY TIMES DO YOU TALK ON THE PHONE WITH FAMILY, FRIENDS, OR NEIGHBORS?: MORE THAN THREE TIMES A WEEK
WITHIN THE LAST YEAR, HAVE YOU BEEN HUMILIATED OR EMOTIONALLY ABUSED IN OTHER WAYS BY YOUR PARTNER OR EX-PARTNER?: NO

## 2024-08-06 ASSESSMENT — LIFESTYLE VARIABLES
HOW MANY STANDARD DRINKS CONTAINING ALCOHOL DO YOU HAVE ON A TYPICAL DAY: 1 OR 2
HOW OFTEN DO YOU HAVE A DRINK CONTAINING ALCOHOL: 2-3 TIMES A WEEK

## 2024-08-06 ASSESSMENT — VISUAL ACUITY
OS_CC: 20/25
OD_CC: 20/40

## 2024-08-06 NOTE — PROGRESS NOTES
apply route 4 times daily as needed (wheezing)  Kassie Ramirez APRN - NP       CareTeam (Including outside providers/suppliers regularly involved in providing care):   Patient Care Team:  Kassie Ramirez APRN - NP as PCP - General  Kassie Ramirez APRN - NP as PCP - Empaneled Provider   Dr Bradford rheumatology  Brittany Powell Pulmonary  Dr Maloney Cardiology  [facility]  Dentist [facility]  ENT Dr Beau Orr OB  Dr Goodson [facility]   Reviewed and updated this visit:  Tobacco  Allergies  Meds  Med Hx  Surg Hx  Soc Hx  Fam Hx

## 2024-08-07 ENCOUNTER — TELEPHONE (OUTPATIENT)
Dept: FAMILY MEDICINE CLINIC | Facility: CLINIC | Age: 69
End: 2024-08-07

## 2024-09-09 DIAGNOSIS — J45.40 MODERATE PERSISTENT ASTHMA WITHOUT COMPLICATION: ICD-10-CM

## 2024-09-18 RX ORDER — ALBUTEROL SULFATE 90 UG/1
2 INHALANT RESPIRATORY (INHALATION) EVERY 4 HOURS PRN
Qty: 1 EACH | Refills: 11 | Status: SHIPPED | OUTPATIENT
Start: 2024-09-18

## 2024-09-24 ENCOUNTER — OFFICE VISIT (OUTPATIENT)
Age: 69
End: 2024-09-24
Payer: MEDICARE

## 2024-09-24 VITALS
HEIGHT: 67 IN | BODY MASS INDEX: 21.03 KG/M2 | HEART RATE: 60 BPM | WEIGHT: 134 LBS | DIASTOLIC BLOOD PRESSURE: 72 MMHG | SYSTOLIC BLOOD PRESSURE: 130 MMHG

## 2024-09-24 DIAGNOSIS — I10 ESSENTIAL HYPERTENSION: ICD-10-CM

## 2024-09-24 DIAGNOSIS — I42.0 DILATED CARDIOMYOPATHY (HCC): Primary | ICD-10-CM

## 2024-09-24 PROCEDURE — 3075F SYST BP GE 130 - 139MM HG: CPT | Performed by: INTERNAL MEDICINE

## 2024-09-24 PROCEDURE — 3017F COLORECTAL CA SCREEN DOC REV: CPT | Performed by: INTERNAL MEDICINE

## 2024-09-24 PROCEDURE — G8420 CALC BMI NORM PARAMETERS: HCPCS | Performed by: INTERNAL MEDICINE

## 2024-09-24 PROCEDURE — 1036F TOBACCO NON-USER: CPT | Performed by: INTERNAL MEDICINE

## 2024-09-24 PROCEDURE — 3078F DIAST BP <80 MM HG: CPT | Performed by: INTERNAL MEDICINE

## 2024-09-24 PROCEDURE — G8427 DOCREV CUR MEDS BY ELIG CLIN: HCPCS | Performed by: INTERNAL MEDICINE

## 2024-09-24 PROCEDURE — G8399 PT W/DXA RESULTS DOCUMENT: HCPCS | Performed by: INTERNAL MEDICINE

## 2024-09-24 PROCEDURE — 1090F PRES/ABSN URINE INCON ASSESS: CPT | Performed by: INTERNAL MEDICINE

## 2024-09-24 PROCEDURE — 99214 OFFICE O/P EST MOD 30 MIN: CPT | Performed by: INTERNAL MEDICINE

## 2024-09-24 PROCEDURE — 1123F ACP DISCUSS/DSCN MKR DOCD: CPT | Performed by: INTERNAL MEDICINE

## 2024-09-24 ASSESSMENT — ENCOUNTER SYMPTOMS: SHORTNESS OF BREATH: 1

## 2024-10-11 NOTE — TELEPHONE ENCOUNTER
Pt is calling back asking if we called in her meds said she is very anxious because she has not had a dose in 2 days and she is not suppose to miss any dose of this med.Please call pt to let her know status of med refill

## 2024-10-11 NOTE — TELEPHONE ENCOUNTER
Requested Prescriptions     Pending Prescriptions Disp Refills    sacubitril-valsartan (ENTRESTO) 24-26 MG per tablet 12 tablet 0     Sig: Take 1 tablet by mouth 2 times daily     Rx verified last ov 9/24/24

## 2024-10-11 NOTE — TELEPHONE ENCOUNTER
MEDICATION REFILL REQUEST      Name of Medication:  Entresto   Dose:  24 -26 mg  Frequency:  twice a day   Quantity:    Days' supply:  6 day supply       Pharmacy Name/Location:  [facility] [address] / Please call in today because patient is completley out and mail order has not come in yet.

## 2024-10-15 RX ORDER — PANTOPRAZOLE SODIUM 40 MG/1
40 TABLET, DELAYED RELEASE ORAL DAILY
Qty: 90 TABLET | OUTPATIENT
Start: 2024-10-15

## 2024-11-18 ENCOUNTER — TRANSCRIBE ORDERS (OUTPATIENT)
Dept: SCHEDULING | Age: 69
End: 2024-11-18

## 2024-11-18 DIAGNOSIS — Z12.31 ENCOUNTER FOR SCREENING MAMMOGRAM FOR MALIGNANT NEOPLASM OF BREAST: Primary | ICD-10-CM

## 2024-11-20 RX ORDER — PANTOPRAZOLE SODIUM 40 MG/1
40 TABLET, DELAYED RELEASE ORAL DAILY
Qty: 90 TABLET | OUTPATIENT
Start: 2024-11-20

## 2024-12-02 ENCOUNTER — TELEPHONE (OUTPATIENT)
Dept: ORTHOPEDIC SURGERY | Age: 69
End: 2024-12-02

## 2024-12-04 ENCOUNTER — OFFICE VISIT (OUTPATIENT)
Dept: ORTHOPEDIC SURGERY | Age: 69
End: 2024-12-04
Payer: MEDICARE

## 2024-12-04 ENCOUNTER — OFFICE VISIT (OUTPATIENT)
Dept: FAMILY MEDICINE CLINIC | Facility: CLINIC | Age: 69
End: 2024-12-04
Payer: MEDICARE

## 2024-12-04 VITALS
BODY MASS INDEX: 21.66 KG/M2 | SYSTOLIC BLOOD PRESSURE: 96 MMHG | RESPIRATION RATE: 18 BRPM | TEMPERATURE: 98.2 F | HEIGHT: 67 IN | OXYGEN SATURATION: 97 % | DIASTOLIC BLOOD PRESSURE: 62 MMHG | WEIGHT: 138 LBS | HEART RATE: 62 BPM

## 2024-12-04 VITALS — WEIGHT: 134 LBS | BODY MASS INDEX: 21.03 KG/M2 | HEIGHT: 67 IN

## 2024-12-04 DIAGNOSIS — M47.816 LUMBAR SPONDYLOSIS: Primary | ICD-10-CM

## 2024-12-04 DIAGNOSIS — J45.40 MODERATE PERSISTENT ASTHMA WITHOUT COMPLICATION: Primary | ICD-10-CM

## 2024-12-04 DIAGNOSIS — K21.9 GASTROESOPHAGEAL REFLUX DISEASE, UNSPECIFIED WHETHER ESOPHAGITIS PRESENT: ICD-10-CM

## 2024-12-04 PROBLEM — J44.1 COPD WITH ACUTE EXACERBATION (HCC): Status: ACTIVE | Noted: 2024-12-04

## 2024-12-04 PROCEDURE — 99214 OFFICE O/P EST MOD 30 MIN: CPT | Performed by: NURSE PRACTITIONER

## 2024-12-04 PROCEDURE — 1159F MED LIST DOCD IN RCRD: CPT | Performed by: NURSE PRACTITIONER

## 2024-12-04 PROCEDURE — 3074F SYST BP LT 130 MM HG: CPT | Performed by: NURSE PRACTITIONER

## 2024-12-04 PROCEDURE — G8420 CALC BMI NORM PARAMETERS: HCPCS | Performed by: PHYSICIAN ASSISTANT

## 2024-12-04 PROCEDURE — 3017F COLORECTAL CA SCREEN DOC REV: CPT | Performed by: PHYSICIAN ASSISTANT

## 2024-12-04 PROCEDURE — G8420 CALC BMI NORM PARAMETERS: HCPCS | Performed by: NURSE PRACTITIONER

## 2024-12-04 PROCEDURE — 1090F PRES/ABSN URINE INCON ASSESS: CPT | Performed by: NURSE PRACTITIONER

## 2024-12-04 PROCEDURE — 99214 OFFICE O/P EST MOD 30 MIN: CPT | Performed by: PHYSICIAN ASSISTANT

## 2024-12-04 PROCEDURE — G8484 FLU IMMUNIZE NO ADMIN: HCPCS | Performed by: PHYSICIAN ASSISTANT

## 2024-12-04 PROCEDURE — 3017F COLORECTAL CA SCREEN DOC REV: CPT | Performed by: NURSE PRACTITIONER

## 2024-12-04 PROCEDURE — G8484 FLU IMMUNIZE NO ADMIN: HCPCS | Performed by: NURSE PRACTITIONER

## 2024-12-04 PROCEDURE — 1036F TOBACCO NON-USER: CPT | Performed by: NURSE PRACTITIONER

## 2024-12-04 PROCEDURE — G8399 PT W/DXA RESULTS DOCUMENT: HCPCS | Performed by: PHYSICIAN ASSISTANT

## 2024-12-04 PROCEDURE — 1123F ACP DISCUSS/DSCN MKR DOCD: CPT | Performed by: NURSE PRACTITIONER

## 2024-12-04 PROCEDURE — 1123F ACP DISCUSS/DSCN MKR DOCD: CPT | Performed by: PHYSICIAN ASSISTANT

## 2024-12-04 PROCEDURE — G8427 DOCREV CUR MEDS BY ELIG CLIN: HCPCS | Performed by: NURSE PRACTITIONER

## 2024-12-04 PROCEDURE — G8428 CUR MEDS NOT DOCUMENT: HCPCS | Performed by: PHYSICIAN ASSISTANT

## 2024-12-04 PROCEDURE — 1036F TOBACCO NON-USER: CPT | Performed by: PHYSICIAN ASSISTANT

## 2024-12-04 PROCEDURE — 1090F PRES/ABSN URINE INCON ASSESS: CPT | Performed by: PHYSICIAN ASSISTANT

## 2024-12-04 PROCEDURE — G2211 COMPLEX E/M VISIT ADD ON: HCPCS | Performed by: PHYSICIAN ASSISTANT

## 2024-12-04 PROCEDURE — G8399 PT W/DXA RESULTS DOCUMENT: HCPCS | Performed by: NURSE PRACTITIONER

## 2024-12-04 PROCEDURE — 3078F DIAST BP <80 MM HG: CPT | Performed by: NURSE PRACTITIONER

## 2024-12-04 RX ORDER — PANTOPRAZOLE SODIUM 40 MG/1
40 TABLET, DELAYED RELEASE ORAL DAILY
Qty: 30 TABLET | Refills: 5 | Status: SHIPPED | OUTPATIENT
Start: 2024-12-04

## 2024-12-04 RX ORDER — FLUTICASONE PROPIONATE AND SALMETEROL 250; 50 UG/1; UG/1
1 POWDER RESPIRATORY (INHALATION) EVERY 12 HOURS
Qty: 60 EACH | Refills: 3 | Status: SHIPPED | OUTPATIENT
Start: 2024-12-04

## 2024-12-04 NOTE — PROGRESS NOTES
Indy Haque (:  1955) is a 68 y.o. female,Established patient, here for evaluation of the following chief complaint(s):  Cough (Went to a Urgent care about 3 weeks ago and was told she had Pneumonia. She wants to see about getting a x-ray done because she feel like she is still not over it.)         Assessment & Plan  Moderate persistent asthma without complication   Will change to generic advair as unable to afford the breo. Urged to get flu and covid vaccines when is well    Orders:    fluticasone-salmeterol (WIXELA INHUB) 250-50 MCG/ACT AEPB diskus inhaler; Inhale 1 puff into the lungs in the morning and 1 puff in the evening.    Gastroesophageal reflux disease, unspecified whether esophagitis present   Chronic, at goal (stable), continue current treatment plan  As has been out of med reflux could worsen cough  Orders:    pantoprazole (PROTONIX) 40 MG tablet; Take 1 tablet by mouth daily      No follow-ups on file.       Subjective   HPI She is here for a persistant  cough since treatment for a bronchitis at urgent care in [place] 3 weeks ago. Has not been on the breo can not afford has been out of the reflux med . Cough is deep and worse at night.   NO fever  Review of Systems   cough  No swelling in feet or legs  Objective   Physical Exam  Vitals and nursing note reviewed.   Constitutional:       Appearance: Normal appearance. She is normal weight.   HENT:      Head: Normocephalic.      Right Ear: Tympanic membrane normal.      Left Ear: Tympanic membrane normal.      Nose: Nose normal.      Mouth/Throat:      Mouth: Mucous membranes are moist.   Cardiovascular:      Rate and Rhythm: Normal rate and regular rhythm.      Pulses: Normal pulses.      Heart sounds: Normal heart sounds.   Pulmonary:      Effort: Pulmonary effort is normal.      Breath sounds: Normal breath sounds.   Musculoskeletal:         General: Normal range of motion.   Skin:     General: Skin is warm and dry.   Neurological:

## 2024-12-04 NOTE — PROGRESS NOTES
diagnoses: Lumbar spondylosis   Chronic Pain Condition that is not stable = not at the patient's treatment goal

## 2024-12-12 NOTE — PROGRESS NOTES
years (30.0 ttl pk-yrs)        Types: Cigarettes        Start date:         Quit date: 2006        Years since quittin.9      Smokeless tobacco: Never    Allergies   Allergen Reactions    Prochlorperazine Other (See Comments)     Dystonic reaction     Current Outpatient Medications   Medication Instructions    albuterol (PROVENTIL) 2.5 mg, Nebulization, 4 TIMES DAILY PRN, Dx code j45.40    albuterol sulfate HFA (PROVENTIL;VENTOLIN;PROAIR) 108 (90 Base) MCG/ACT inhaler 2 puffs, Inhalation, EVERY 4 HOURS PRN    amphetamine-dextroamphetamine (ADDERALL) 20 MG tablet 20 mg, Oral, DAILY    ascorbic acid (VITAMIN C) 1000 MG tablet 1 tablet, Oral, Every Day    azelastine (ASTELIN) 0.1 % nasal spray 1 spray, Nasal, 2 TIMES DAILY    Biotin 1000 MCG TABS 1 tablet, Oral, Every Day    cetirizine (ZYRTEC) 10 MG tablet 1 tablet, Oral, Every Day    dicyclomine (BENTYL) 10 MG capsule TAKE 1 CAPSULE BY MOUTH 3 TIMES A DAY AS NEEDED FOR PAIN    fluticasone (CUTIVATE) 0.05 % cream APPLY TO AFFECTED AREAS ON FACE TWICE A DAY    fluticasone furoate-vilanterol (BREO ELLIPTA) 100-25 MCG/ACT inhaler 1 puff, Inhalation, DAILY, J45.9    Garlic 1000 MG CAPS Oral    hydrocortisone 1 % cream 1 Application    hyoscyamine (LEVSIN/SL) 125 MCG sublingual tablet PLACE 1 TABLET BY SUBLINGUAL ROUTE EVERY 6 HOURS AS NEEDED FOR PAIN    Linzess 72 mcg, Oral, DAILY BEFORE BREAKFAST    metoprolol succinate (TOPROL XL) 25 MG extended release tablet TAKE 1 TABLET BY MOUTH IN THE MORNING AND IN THE EVENING    Multiple Vitamin (MULTI-VITAMIN) TABS 1 tablet, Oral    Nebulizers (COMPRESSOR/NEBULIZER) MISC 1 each, Does not apply, 4 TIMES DAILY PRN    Omega-3 Fatty Acids (FISH OIL) 1000 MG capsule 1 capsule    pantoprazole (PROTONIX) 40 mg, Oral, DAILY    predniSONE (DELTASONE) 20 MG tablet 2 tabs po qd x 3 days, 1.5 tabs po qd x 3 days, 1 tab po qd x 3 days, 1/2 tab po qd x 3 days.    Respiratory Therapy Supplies (NEBULIZER/TUBING/MOUTHPIECE) KIT 1

## 2024-12-16 ENCOUNTER — OFFICE VISIT (OUTPATIENT)
Dept: ORTHOPEDIC SURGERY | Age: 69
End: 2024-12-16
Payer: MEDICARE

## 2024-12-16 ENCOUNTER — OFFICE VISIT (OUTPATIENT)
Dept: PULMONOLOGY | Age: 69
End: 2024-12-16
Payer: MEDICARE

## 2024-12-16 VITALS
HEIGHT: 67 IN | OXYGEN SATURATION: 99 % | SYSTOLIC BLOOD PRESSURE: 112 MMHG | TEMPERATURE: 97.2 F | RESPIRATION RATE: 18 BRPM | WEIGHT: 138.7 LBS | BODY MASS INDEX: 21.77 KG/M2 | DIASTOLIC BLOOD PRESSURE: 64 MMHG | HEART RATE: 61 BPM

## 2024-12-16 DIAGNOSIS — M48.062 SPINAL STENOSIS, LUMBAR REGION WITH NEUROGENIC CLAUDICATION: Primary | ICD-10-CM

## 2024-12-16 DIAGNOSIS — J45.41 MODERATE PERSISTENT ASTHMA WITH EXACERBATION: Primary | ICD-10-CM

## 2024-12-16 DIAGNOSIS — J45.40 MODERATE PERSISTENT ASTHMA WITHOUT COMPLICATION: ICD-10-CM

## 2024-12-16 DIAGNOSIS — K21.9 GASTROESOPHAGEAL REFLUX DISEASE WITHOUT ESOPHAGITIS: ICD-10-CM

## 2024-12-16 DIAGNOSIS — G47.00 INSOMNIA, UNSPECIFIED TYPE: ICD-10-CM

## 2024-12-16 PROCEDURE — 62323 NJX INTERLAMINAR LMBR/SAC: CPT | Performed by: PHYSICAL MEDICINE & REHABILITATION

## 2024-12-16 PROCEDURE — 3074F SYST BP LT 130 MM HG: CPT | Performed by: NURSE PRACTITIONER

## 2024-12-16 PROCEDURE — 1126F AMNT PAIN NOTED NONE PRSNT: CPT | Performed by: NURSE PRACTITIONER

## 2024-12-16 PROCEDURE — 1159F MED LIST DOCD IN RCRD: CPT | Performed by: NURSE PRACTITIONER

## 2024-12-16 PROCEDURE — 3078F DIAST BP <80 MM HG: CPT | Performed by: NURSE PRACTITIONER

## 2024-12-16 PROCEDURE — 99214 OFFICE O/P EST MOD 30 MIN: CPT | Performed by: NURSE PRACTITIONER

## 2024-12-16 PROCEDURE — G8484 FLU IMMUNIZE NO ADMIN: HCPCS | Performed by: NURSE PRACTITIONER

## 2024-12-16 PROCEDURE — 1036F TOBACCO NON-USER: CPT | Performed by: NURSE PRACTITIONER

## 2024-12-16 PROCEDURE — G2211 COMPLEX E/M VISIT ADD ON: HCPCS | Performed by: NURSE PRACTITIONER

## 2024-12-16 PROCEDURE — G8420 CALC BMI NORM PARAMETERS: HCPCS | Performed by: NURSE PRACTITIONER

## 2024-12-16 PROCEDURE — 1123F ACP DISCUSS/DSCN MKR DOCD: CPT | Performed by: NURSE PRACTITIONER

## 2024-12-16 PROCEDURE — G8399 PT W/DXA RESULTS DOCUMENT: HCPCS | Performed by: NURSE PRACTITIONER

## 2024-12-16 PROCEDURE — 1090F PRES/ABSN URINE INCON ASSESS: CPT | Performed by: NURSE PRACTITIONER

## 2024-12-16 PROCEDURE — G8427 DOCREV CUR MEDS BY ELIG CLIN: HCPCS | Performed by: NURSE PRACTITIONER

## 2024-12-16 PROCEDURE — 3017F COLORECTAL CA SCREEN DOC REV: CPT | Performed by: NURSE PRACTITIONER

## 2024-12-16 RX ORDER — FLUTICASONE FUROATE AND VILANTEROL 100; 25 UG/1; UG/1
1 POWDER RESPIRATORY (INHALATION) DAILY
Qty: 1 EACH | Refills: 11 | Status: SHIPPED | OUTPATIENT
Start: 2024-12-16

## 2024-12-16 RX ORDER — VITAMIN E 268 MG
1 CAPSULE ORAL
COMMUNITY

## 2024-12-16 RX ORDER — CHLORAL HYDRATE 500 MG
1 CAPSULE ORAL
COMMUNITY

## 2024-12-16 RX ORDER — MULTIVITAMIN
1 TABLET ORAL
COMMUNITY

## 2024-12-16 RX ORDER — PREDNISONE 20 MG/1
TABLET ORAL
Qty: 15 TABLET | Refills: 0 | Status: SHIPPED | OUTPATIENT
Start: 2024-12-16

## 2024-12-16 RX ORDER — FLUTICASONE PROPIONATE 0.05 %
CREAM (GRAM) TOPICAL
COMMUNITY

## 2024-12-16 RX ORDER — BETAMETHASONE SODIUM PHOSPHATE AND BETAMETHASONE ACETATE 3; 3 MG/ML; MG/ML
12 INJECTION, SUSPENSION INTRA-ARTICULAR; INTRALESIONAL; INTRAMUSCULAR; SOFT TISSUE ONCE
Status: COMPLETED | OUTPATIENT
Start: 2024-12-16 | End: 2024-12-16

## 2024-12-16 RX ORDER — BIOTIN 1 MG
1 TABLET ORAL
COMMUNITY

## 2024-12-16 RX ORDER — BENZOCAINE/MENTHOL 6 MG-10 MG
1 LOZENGE MUCOUS MEMBRANE
COMMUNITY

## 2024-12-16 RX ORDER — GINSENG 100 MG
1 CAPSULE ORAL
COMMUNITY

## 2024-12-16 RX ORDER — CETIRIZINE HYDROCHLORIDE 10 MG/1
1 TABLET ORAL
COMMUNITY

## 2024-12-16 RX ORDER — ALBUTEROL SULFATE 0.83 MG/ML
2.5 SOLUTION RESPIRATORY (INHALATION) 4 TIMES DAILY PRN
Qty: 120 EACH | Refills: 11 | Status: SHIPPED | OUTPATIENT
Start: 2024-12-16

## 2024-12-16 RX ORDER — DICYCLOMINE HYDROCHLORIDE 10 MG/1
CAPSULE ORAL
COMMUNITY
Start: 2024-11-20

## 2024-12-16 RX ORDER — PREDNISONE 20 MG/1
TABLET ORAL
COMMUNITY
Start: 2024-11-09 | End: 2024-12-16

## 2024-12-16 RX ADMIN — BETAMETHASONE SODIUM PHOSPHATE AND BETAMETHASONE ACETATE 12 MG: 3; 3 INJECTION, SUSPENSION INTRA-ARTICULAR; INTRALESIONAL; INTRAMUSCULAR; SOFT TISSUE at 10:35

## 2024-12-16 NOTE — PATIENT INSTRUCTIONS
Prednisone 20 mg tabs--2 tabs daily x 3 days, 1 and 1/2 tabs daily x 3 days, 1 tab daily x 3 days, 1/2 tab daily x 3 days.

## 2024-12-16 NOTE — PROGRESS NOTES
Name: Indy Haque  YOB: 1955  Gender: female  MRN: [id number]        Interlaminar CHANTE Procedure Note      Procedure: L4-L5 interlaminar epidural steroid injection    Precautions: Indy Haque denies prior sensitivity to steroid, local anesthetic, iodine, or shellfish.       Consent:  Consent was obtained prior to the procedure. The procedure was discussed at length with Indy Haque. She was given the opportunity to ask questions regarding the procedure and its associated risks.  In addition to the potential risks associated with the procedure itself, the patient was informed both verbally and in writing of potential side effects of the use glucocorticoids.  The patient appeared to comprehend the informed consent and desired to have the procedure performed, and informed consent was signed.     She was placed in a prone position on the fluoroscopy table and the skin was prepped and draped in a routine sterile fashion. The areas to be injected were each anesthetized with 1 ml of 1% Lidocaine. A 22 gauge 3.5 inch spinal needle was carefully advanced under fluoroscopic guidance to the L4-L5 interlaminar space. 0.5 ml of 70% of Omnipaque was injected to confirm proper needle placement and absence of subdural or vascular flow Once proper placement was confirmed, 2ml of sterile water and 12 mg of betamethasone were injected through the spinal needle.       Fluoroscopic guidance was used intermittently over a 10-minute period to insure proper needle placement and her safety. A hard copy of the fluoroscopic image has been placed in her chart and is saved on the C-arm hard drive. She was monitored for 30 minutes after the procedure and discharged home in a stable fashion with a routine follow up.    Procedural Diagnosis:     ICD-10-CM    1. Spinal stenosis, lumbar region with neurogenic claudication  M48.062 XR INJ SPINE THER SUBST LUM/SAC W IMG     betamethasone acetate-betamethasone

## 2024-12-23 NOTE — TELEPHONE ENCOUNTER
MEDICATION REFILL REQUEST    Pt is out of med and need like 10 pills to get her trough Friday before [facility] sends it      Name of Medication:  entresto   Dose:     Frequency:  twice daily   Quantity:     Days' supply:             Pharmacy Name/Location:  [facility], [phone]

## 2024-12-24 NOTE — TELEPHONE ENCOUNTER
Spoke with patient she is not home to  samples needs a short supply sent to [facility].    Requested Prescriptions     Pending Prescriptions Disp Refills    sacubitril-valsartan (ENTRESTO) 24-26 MG per tablet 14 tablet 0     Sig: Take 1 tablet by mouth 2 times daily Does not have enough to last until mail order supply later this week

## 2025-01-08 ENCOUNTER — HOSPITAL ENCOUNTER (OUTPATIENT)
Dept: MAMMOGRAPHY | Age: 70
Discharge: HOME | End: 2025-01-11
Payer: MEDICARE

## 2025-01-08 DIAGNOSIS — Z12.31 ENCOUNTER FOR SCREENING MAMMOGRAM FOR MALIGNANT NEOPLASM OF BREAST: ICD-10-CM

## 2025-01-08 PROCEDURE — 77067 SCR MAMMO BI INCL CAD: CPT

## 2025-01-08 PROCEDURE — 77063 BREAST TOMOSYNTHESIS BI: CPT

## 2025-01-28 ENCOUNTER — TELEPHONE (OUTPATIENT)
Dept: ORTHOPEDIC SURGERY | Age: 70
End: 2025-01-28

## 2025-01-28 DIAGNOSIS — M47.816 LUMBAR SPONDYLOSIS: Primary | ICD-10-CM

## 2025-02-05 NOTE — TELEPHONE ENCOUNTER
Requested Prescriptions     Pending Prescriptions Disp Refills    sacubitril-valsartan (ENTRESTO) 24-26 MG per tablet 14 tablet 0     Sig: Take 1 tablet by mouth 2 times daily Does not have enough to last until mail order supply later this week    sacubitril-valsartan (ENTRESTO) 24-26 MG per tablet 180 tablet 3     Sig: Take 1 tablet by mouth 2 times daily     Sending to patient assistance and a local.

## 2025-02-05 NOTE — TELEPHONE ENCOUNTER
She is OUT of Entresto and [employer] phone # [phone]  Has to have another RX    Can we call in a few to [facility] on [address]

## 2025-02-13 ENCOUNTER — OFFICE VISIT (OUTPATIENT)
Dept: ORTHOPEDIC SURGERY | Age: 70
End: 2025-02-13

## 2025-02-13 DIAGNOSIS — M48.062 SPINAL STENOSIS, LUMBAR REGION WITH NEUROGENIC CLAUDICATION: ICD-10-CM

## 2025-02-13 DIAGNOSIS — M41.25 OTHER IDIOPATHIC SCOLIOSIS, THORACOLUMBAR REGION: ICD-10-CM

## 2025-02-13 DIAGNOSIS — M47.816 LUMBAR SPONDYLOSIS: Primary | ICD-10-CM

## 2025-02-13 RX ORDER — TRIAMCINOLONE ACETONIDE 40 MG/ML
40 INJECTION, SUSPENSION INTRA-ARTICULAR; INTRAMUSCULAR ONCE
Status: COMPLETED | OUTPATIENT
Start: 2025-02-13 | End: 2025-02-13

## 2025-02-13 RX ADMIN — TRIAMCINOLONE ACETONIDE 40 MG: 40 INJECTION, SUSPENSION INTRA-ARTICULAR; INTRAMUSCULAR at 14:36

## 2025-02-13 NOTE — PROGRESS NOTES
Name: Indy Haque  YOB: 1955  Gender: female  MRN: [id number]        Interlaminar CHANTE Procedure Note      Procedure: L5-S1 interlaminar epidural steroid injection    Precautions: Indy Haque denies prior sensitivity to steroid, local anesthetic, iodine, or shellfish.     Recently, she has been experiencing a feeling of weakness in the right leg when she goes from sit to stand and starts walking.  On today's exam, no pain with resisted hip flexion.  No pain with hip internal/external rotation.  Straight leg raise is negative. No foot drop.  Strength is 5/5 in the right lower extremity.  Nontender right knee.  No knee effusion.  If the feeling of intermittent right leg weakness persists, we will consider new lumbar spine MRI, which we discussed today.    Consent:  Consent was obtained prior to the procedure. The procedure was discussed at length with Indy Haque. She was given the opportunity to ask questions regarding the procedure and its associated risks.  In addition to the potential risks associated with the procedure itself, the patient was informed both verbally and in writing of potential side effects of the use glucocorticoids.  The patient appeared to comprehend the informed consent and desired to have the procedure performed, and informed consent was signed.     She was placed in a prone position on the fluoroscopy table and the skin was prepped and draped in a routine sterile fashion. The areas to be injected were each anesthetized with 1 ml of 1% Lidocaine. A 22 gauge 3.5 inch spinal needle was carefully advanced under fluoroscopic guidance to the L5-S1 interlaminar space (right paramedian). 0.5 ml of 70% of Omnipaque was injected to confirm proper needle placement and absence of subdural or vascular flow Once proper placement was confirmed, 2ml of sterile water and 12 mg of betamethasone were injected through the spinal needle.       Fluoroscopic guidance was used

## 2025-03-16 ENCOUNTER — RESULTS FOLLOW-UP (OUTPATIENT)
Age: 70
End: 2025-03-16

## 2025-03-24 NOTE — PROGRESS NOTES
[facility] CARDIOLOGY  [address] [place] [address], [address]  [place] [address]  PHONE: [phone]      25    NAME:  Indy Haque  : 1955  MRN: [id number]         SUBJECTIVE:   Indy Haque is a 69 y.o. female seen for a follow up visit regarding the following:     Chief Complaint   Patient presents with    Follow-up    Cardiomyopathy            HPI:  Follow up  Follow-up and Cardiomyopathy   .    Follow up NICM, EF recovered to normal, recovering alcoholic. Didn't tolerate SGLT2i therapy.   Echo continues to look well, EF dropped to low normal but remains normal.  She continues to feel well, takes her meds regularly, remains alcohol free.         PAST CARDIAC HISTORY:  Alcohol abuse  2022      COVID 19 infection, outpatient care  Aug 2022     3 day monitor reviewed - NSR, frequent unifocal pvc (11.3%), rare brief AT, longest 2.9 seconds     NST - anterior artifact, EF 29%     Echo - EF 35-40%, dilated LV     LHC - normal coronaries, EF 25-30%, LVE, EDP 12  Dec 2022       EF 45-50%  2023       EF 60-65%, ind DF, RVSP 30  Mar 2025       EF 50-55%, normal DF                  Cardiac Medications       Beta Blockers Cardio-Selective       metoprolol succinate (TOPROL XL) 25 MG extended release tablet TAKE 1 TABLET BY MOUTH IN THE MORNING AND IN THE EVENING       Cardiovascular Agents Misc. - Combinations       sacubitril-valsartan (ENTRESTO) 24-26 MG per tablet Take 1 tablet by mouth 2 times daily                  Past Medical History, Past Surgical History, Family history, Social History, and Medications were all reviewed with the patient today and updated as necessary.     Prior to Admission medications    Medication Sig Start Date End Date Taking? Authorizing Provider   ascorbic acid (VITAMIN C) 1000 MG tablet Take 1 tablet by mouth Every Day   Yes Lara Adams MD   Biotin 1000 MCG TABS Take 1 tablet by mouth Every Day   Yes Lara Adams MD   cetirizine (ZYRTEC) 10 MG

## 2025-03-25 ENCOUNTER — OFFICE VISIT (OUTPATIENT)
Age: 70
End: 2025-03-25
Payer: MEDICARE

## 2025-03-25 VITALS
WEIGHT: 140 LBS | BODY MASS INDEX: 21.97 KG/M2 | SYSTOLIC BLOOD PRESSURE: 118 MMHG | HEART RATE: 64 BPM | HEIGHT: 67 IN | DIASTOLIC BLOOD PRESSURE: 62 MMHG

## 2025-03-25 DIAGNOSIS — I10 ESSENTIAL HYPERTENSION: ICD-10-CM

## 2025-03-25 DIAGNOSIS — I42.0 DILATED CARDIOMYOPATHY (HCC): Primary | ICD-10-CM

## 2025-03-25 PROCEDURE — 99214 OFFICE O/P EST MOD 30 MIN: CPT | Performed by: INTERNAL MEDICINE

## 2025-03-25 PROCEDURE — 1126F AMNT PAIN NOTED NONE PRSNT: CPT | Performed by: INTERNAL MEDICINE

## 2025-03-25 PROCEDURE — 1160F RVW MEDS BY RX/DR IN RCRD: CPT | Performed by: INTERNAL MEDICINE

## 2025-03-25 PROCEDURE — 1090F PRES/ABSN URINE INCON ASSESS: CPT | Performed by: INTERNAL MEDICINE

## 2025-03-25 PROCEDURE — G8427 DOCREV CUR MEDS BY ELIG CLIN: HCPCS | Performed by: INTERNAL MEDICINE

## 2025-03-25 PROCEDURE — G8399 PT W/DXA RESULTS DOCUMENT: HCPCS | Performed by: INTERNAL MEDICINE

## 2025-03-25 PROCEDURE — 1123F ACP DISCUSS/DSCN MKR DOCD: CPT | Performed by: INTERNAL MEDICINE

## 2025-03-25 PROCEDURE — G8420 CALC BMI NORM PARAMETERS: HCPCS | Performed by: INTERNAL MEDICINE

## 2025-03-25 PROCEDURE — 3017F COLORECTAL CA SCREEN DOC REV: CPT | Performed by: INTERNAL MEDICINE

## 2025-03-25 PROCEDURE — 3074F SYST BP LT 130 MM HG: CPT | Performed by: INTERNAL MEDICINE

## 2025-03-25 PROCEDURE — 3078F DIAST BP <80 MM HG: CPT | Performed by: INTERNAL MEDICINE

## 2025-03-25 PROCEDURE — 1036F TOBACCO NON-USER: CPT | Performed by: INTERNAL MEDICINE

## 2025-03-25 PROCEDURE — 1159F MED LIST DOCD IN RCRD: CPT | Performed by: INTERNAL MEDICINE

## 2025-03-25 ASSESSMENT — ENCOUNTER SYMPTOMS: SHORTNESS OF BREATH: 0

## 2025-05-13 ENCOUNTER — TELEPHONE (OUTPATIENT)
Dept: PULMONOLOGY | Age: 70
End: 2025-05-13

## 2025-05-13 NOTE — TELEPHONE ENCOUNTER
Attempted to call patient to schedule next follow up with LATOYA Campbell full, sending letter/mychart for patient to call       Return in about 6 months (around 6/16/2025)   for Duglas/asthma, Arrive 15 minutes   prior to appt time.

## 2025-05-27 RX ORDER — METOPROLOL SUCCINATE 25 MG/1
TABLET, EXTENDED RELEASE ORAL
Qty: 180 TABLET | Refills: 1 | Status: SHIPPED | OUTPATIENT
Start: 2025-05-27

## 2025-06-17 ENCOUNTER — TELEPHONE (OUTPATIENT)
Dept: ADMINISTRATIVE | Age: 70
End: 2025-06-17

## 2025-06-17 DIAGNOSIS — M48.061 SPINAL STENOSIS, LUMBAR REGION WITHOUT NEUROGENIC CLAUDICATION: Primary | ICD-10-CM

## 2025-06-26 ENCOUNTER — OFFICE VISIT (OUTPATIENT)
Dept: ORTHOPEDIC SURGERY | Age: 70
End: 2025-06-26
Payer: MEDICARE

## 2025-06-26 DIAGNOSIS — M48.062 SPINAL STENOSIS, LUMBAR REGION WITH NEUROGENIC CLAUDICATION: ICD-10-CM

## 2025-06-26 DIAGNOSIS — M47.816 LUMBAR SPONDYLOSIS: Primary | ICD-10-CM

## 2025-06-26 PROCEDURE — 62323 NJX INTERLAMINAR LMBR/SAC: CPT | Performed by: PHYSICAL MEDICINE & REHABILITATION

## 2025-06-26 RX ORDER — BETAMETHASONE SODIUM PHOSPHATE AND BETAMETHASONE ACETATE 3; 3 MG/ML; MG/ML
12 INJECTION, SUSPENSION INTRA-ARTICULAR; INTRALESIONAL; INTRAMUSCULAR; SOFT TISSUE ONCE
Status: COMPLETED | OUTPATIENT
Start: 2025-06-26 | End: 2025-06-26

## 2025-06-26 RX ADMIN — BETAMETHASONE SODIUM PHOSPHATE AND BETAMETHASONE ACETATE 12 MG: 3; 3 INJECTION, SUSPENSION INTRA-ARTICULAR; INTRALESIONAL; INTRAMUSCULAR; SOFT TISSUE at 15:59

## 2025-06-26 NOTE — PROGRESS NOTES
Name: Indy Haque  YOB: 1955  Gender: female  MRN: [id number]        Interlaminar CHANTE Procedure Note      Procedure: L4-L5 interlaminar epidural steroid injection    Precautions: Indy Haque denies prior sensitivity to steroid, local anesthetic, iodine, or shellfish.       Consent:  Consent was obtained prior to the procedure. The procedure was discussed at length with Indy Haque. She was given the opportunity to ask questions regarding the procedure and its associated risks.  In addition to the potential risks associated with the procedure itself, the patient was informed both verbally and in writing of potential side effects of the use glucocorticoids.  The patient appeared to comprehend the informed consent and desired to have the procedure performed, and informed consent was signed.     She was placed in a prone position on the fluoroscopy table and the skin was prepped and draped in a routine sterile fashion. The areas to be injected were each anesthetized with 1 ml of 1% Lidocaine. A 22 gauge 3.5 inch spinal needle was carefully advanced under fluoroscopic guidance to the L4-L5 interlaminar space (right paramedian). 0.5 ml of 70% of Omnipaque was injected to confirm proper needle placement and absence of subdural or vascular flow Once proper placement was confirmed, 2ml of sterile water and 12 mg of betamethasone were injected through the spinal needle.       Fluoroscopic guidance was used intermittently over a 10-minute period to insure proper needle placement and her safety. A hard copy of the fluoroscopic image has been placed in her chart and is saved on the C-arm hard drive. She was monitored after the procedure and discharged home in a stable fashion with a routine follow up.    Procedural Diagnosis: No diagnosis found.   LETI CHOE MD  06/26/25

## 2025-09-02 ENCOUNTER — TELEPHONE (OUTPATIENT)
Dept: ORTHOPEDIC SURGERY | Age: 70
End: 2025-09-02

## 2025-09-02 DIAGNOSIS — M47.816 LUMBAR SPONDYLOSIS: Primary | ICD-10-CM

## 2025-09-02 DIAGNOSIS — M48.062 SPINAL STENOSIS, LUMBAR REGION WITH NEUROGENIC CLAUDICATION: ICD-10-CM

## (undated) DEVICE — GUARDIAN LVC: Brand: GUARDIAN

## (undated) DEVICE — (D)STRIP SKN CLSR 0.5X4IN WHT --

## (undated) DEVICE — (D)PREP SKN CHLRAPRP APPL 26ML -- CONVERT TO ITEM 371833

## (undated) DEVICE — NEEDLE HYPO 18GA L1.5IN PNK S STL HUB POLYPR SHLD REG BVL

## (undated) DEVICE — NDL SPNE QNCKE 18GX3.5IN LF --

## (undated) DEVICE — 3M™ COBAN™ SELF-ADHERENT WRAP, 1586S, STERILE, 6 IN X 5 YD (15 CM X 4,5 M), 12 ROLLS/CASE: Brand: 3M™ COBAN™

## (undated) DEVICE — RADIFOCUS OPTITORQUE ANGIOGRAPHIC CATHETER: Brand: OPTITORQUE

## (undated) DEVICE — GUIDEWIRE 035IN 210CM PTFE COAT FIX COR J TIP 15MM FIRM BODY

## (undated) DEVICE — BUTTON SWITCH PENCIL BLADE ELECTRODE, HOLSTER: Brand: EDGE

## (undated) DEVICE — REM POLYHESIVE ADULT PATIENT RETURN ELECTRODE: Brand: VALLEYLAB

## (undated) DEVICE — PRECISION THIN (9.0 X 0.38 X 31.0MM)

## (undated) DEVICE — INTENDED FOR TISSUE SEPARATION, AND OTHER PROCEDURES THAT REQUIRE A SHARP SURGICAL BLADE TO PUNCTURE OR CUT.: Brand: BARD-PARKER ® STAINLESS STEEL BLADES

## (undated) DEVICE — GLIDESHEATH SLENDER STAINLESS STEEL KIT: Brand: GLIDESHEATH SLENDER

## (undated) DEVICE — SYR 50ML LR LCK 1ML GRAD NSAF --

## (undated) DEVICE — SET IRRIG DST FLX M CONN

## (undated) DEVICE — DRAPE,SHOULDER,BEACH CHAIR,STERILE: Brand: MEDLINE

## (undated) DEVICE — SOFT SILICONE HYDROCELLULAR FOAM DRESSING WITH LOCK AWAY LAYER: Brand: ALLEVYN LIFE XL 21X21 CTN10

## (undated) DEVICE — [AGGRESSIVE 6-FLUTE BARREL BUR, ARTHROSCOPIC SHAVER BLADE,  DO NOT RESTERILIZE,  DO NOT USE IF PACKAGE IS DAMAGED,  KEEP DRY,  KEEP AWAY FROM SUNLIGHT]: Brand: FORMULA

## (undated) DEVICE — SURGICAL PROCEDURE PACK BASIC ST FRANCIS

## (undated) DEVICE — 2.0MM XBRAID TT, 100% UHMWPE, VIOLET CO-BRAID: Brand: XBRAID

## (undated) DEVICE — KENDALL SCD EXPRESS SLEEVES, KNEE LENGTH, MEDIUM: Brand: KENDALL SCD

## (undated) DEVICE — [RESECTOR CUTTER, ARTHROSCOPIC SHAVER BLADE,  DO NOT RESTERILIZE,  DO NOT USE IF PACKAGE IS DAMAGED,  KEEP DRY,  KEEP AWAY FROM SUNLIGHT]: Brand: FORMULA

## (undated) DEVICE — CATHETER DIAG AD 5FR L110CM 145DEG COR GRY HYDRPHLC NYL

## (undated) DEVICE — MASTISOL ADHESIVE LIQ 2/3ML

## (undated) DEVICE — SLING ARM 2-37INX8-17IN PCH -- MED

## (undated) DEVICE — STOCKINETTE,IMPERVIOUS,12X48,STERILE: Brand: MEDLINE

## (undated) DEVICE — BAND COMPR L21CM SHT CLR PLAS HEMSTAT EXT HK AND LOOP RETEN

## (undated) DEVICE — RESTRAINT UNIVERSAL HEAD DISP --

## (undated) DEVICE — 90-S ACCELERATOR, SUCTION PROBE, NON-BENDABLE, MAX CUT LEVEL 11: Brand: SERFAS ENERGY

## (undated) DEVICE — SET IRRIG W 96IN TBNG 4 LN FLX BG

## (undated) DEVICE — SUTURE MCRYL SZ 3-0 L27IN ABSRB UD L19MM PS-2 3/8 CIR PRIM Y427H

## (undated) DEVICE — SOLUTION IRRIG 3000ML 0.9% SOD CHL FLX CONT 0797208] ICU MEDICAL INC]